# Patient Record
Sex: MALE | Race: BLACK OR AFRICAN AMERICAN | Employment: FULL TIME | URBAN - METROPOLITAN AREA
[De-identification: names, ages, dates, MRNs, and addresses within clinical notes are randomized per-mention and may not be internally consistent; named-entity substitution may affect disease eponyms.]

---

## 2017-06-20 ENCOUNTER — HOSPITAL ENCOUNTER (OUTPATIENT)
Dept: LAB | Age: 57
Discharge: HOME | End: 2017-06-20
Payer: COMMERCIAL

## 2017-06-20 DIAGNOSIS — E66.9 OBESITY (BMI 30-39.9): ICD-10-CM

## 2017-06-20 DIAGNOSIS — R73.03 PREDIABETES: ICD-10-CM

## 2017-06-20 LAB
ALBUMIN SERPL BCP-MCNC: 4.1 G/DL (ref 3.4–5)
ALBUMIN/GLOB SERPL: 1.2 {RATIO} (ref 0.8–1.7)
ALP SERPL-CCNC: 50 U/L (ref 45–117)
ALT SERPL-CCNC: 55 U/L (ref 16–61)
ANION GAP BLD CALC-SCNC: 7 MMOL/L (ref 3–18)
AST SERPL W P-5'-P-CCNC: 28 U/L (ref 15–37)
BILIRUB SERPL-MCNC: 0.7 MG/DL (ref 0.2–1)
BUN SERPL-MCNC: 12 MG/DL (ref 7–18)
BUN/CREAT SERPL: 13 (ref 12–20)
CALCIUM SERPL-MCNC: 9.3 MG/DL (ref 8.5–10.1)
CHLORIDE SERPL-SCNC: 106 MMOL/L (ref 100–108)
CHOLEST SERPL-MCNC: 180 MG/DL
CO2 SERPL-SCNC: 28 MMOL/L (ref 21–32)
CREAT SERPL-MCNC: 0.94 MG/DL (ref 0.6–1.3)
GLOBULIN SER CALC-MCNC: 3.3 G/DL (ref 2–4)
GLUCOSE SERPL-MCNC: 87 MG/DL (ref 74–99)
HBA1C MFR BLD: 5.7 % (ref 4.2–5.6)
HDLC SERPL-MCNC: 48 MG/DL (ref 40–60)
HDLC SERPL: 3.8 {RATIO} (ref 0–5)
LDLC SERPL CALC-MCNC: 103.4 MG/DL (ref 0–100)
LIPID PROFILE,FLP: ABNORMAL
POTASSIUM SERPL-SCNC: 4.4 MMOL/L (ref 3.5–5.5)
PROT SERPL-MCNC: 7.4 G/DL (ref 6.4–8.2)
SODIUM SERPL-SCNC: 141 MMOL/L (ref 136–145)
TRIGL SERPL-MCNC: 143 MG/DL (ref ?–150)
VLDLC SERPL CALC-MCNC: 28.6 MG/DL

## 2017-06-20 PROCEDURE — 83036 HEMOGLOBIN GLYCOSYLATED A1C: CPT | Performed by: INTERNAL MEDICINE

## 2017-06-20 PROCEDURE — 36415 COLL VENOUS BLD VENIPUNCTURE: CPT | Performed by: INTERNAL MEDICINE

## 2017-06-20 PROCEDURE — 80053 COMPREHEN METABOLIC PANEL: CPT | Performed by: INTERNAL MEDICINE

## 2017-06-20 PROCEDURE — 80061 LIPID PANEL: CPT | Performed by: INTERNAL MEDICINE
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
## 2017-06-27 ENCOUNTER — OFFICE VISIT (OUTPATIENT)
Dept: INTERNAL MEDICINE CLINIC | Age: 57
End: 2017-06-27

## 2017-06-27 VITALS
SYSTOLIC BLOOD PRESSURE: 136 MMHG | HEART RATE: 72 BPM | WEIGHT: 234.4 LBS | BODY MASS INDEX: 37.67 KG/M2 | OXYGEN SATURATION: 98 % | TEMPERATURE: 98.2 F | HEIGHT: 66 IN | DIASTOLIC BLOOD PRESSURE: 86 MMHG | RESPIRATION RATE: 18 BRPM

## 2017-06-27 DIAGNOSIS — R73.03 PREDIABETES: ICD-10-CM

## 2017-06-27 DIAGNOSIS — E66.9 OBESITY (BMI 30-39.9): Primary | ICD-10-CM

## 2017-06-27 DIAGNOSIS — M19.90 OSTEOARTHRITIS, UNSPECIFIED OSTEOARTHRITIS TYPE, UNSPECIFIED SITE: ICD-10-CM

## 2017-06-27 NOTE — PROGRESS NOTES
Chief Complaint   Patient presents with    Results     follow up with lab review     1. Have you been to the ER, urgent care clinic since your last visit? Hospitalized since your last visit? No    2. Have you seen or consulted any other health care providers outside of the [address] [place] [address] since your last visit? Include any pap smears or colon screening.  No

## 2017-06-27 NOTE — MR AVS SNAPSHOT
Visit Information Date & Time Provider Department Dept. Phone Encounter #  
 6/27/2017  3:15 PM Lucila Gallegos MD Internist of [address] [id number] Your Appointments 12/21/2017  7:55 AM  
LAB with [facility] NURSE VISIT Internist of [facility] ([facility]) Appt Note: lab  
 [address], [address] [facility] [address] [place]  
  
   
 [address], [address]  
  
    
 12/28/2017  2:45 PM  
Office Visit with Lucila Gallegos MD  
[facility] [address] [place] [address] [facility]: 6 month follow up  
 [address], [address] [place] [address] [address] [place]  
  
   
 [address], [address] Upcoming Health Maintenance Date Due INFLUENZA AGE 9 TO ADULT 8/1/2017 COLONOSCOPY 7/12/2018 DTaP/Tdap/Td series (2 - Td) 2/5/2024 Allergies as of 6/27/2017  Review Complete On: 12/19/2016 By: Lucila Gallegos MD  
 No Known Allergies Current Immunizations  Reviewed on 2/5/2014 Name Date  
 TD Vaccine 8/14/2008 Tdap 2/5/2014 Not reviewed this visit Vitals BP Pulse Temp Resp Height(growth percentile) Weight(growth percentile) 144/88 (BP 1 Location: Left arm, BP Patient Position: Sitting) 72 98.2 °F (36.8 °C) (Oral) 18 5' 6\" (1.676 m) 234 lb 6.4 oz (106.3 kg) SpO2 BMI Smoking Status 98% 37.83 kg/m2 Former Smoker Vitals History BMI and BSA Data Body Mass Index Body Surface Area  
 37.83 kg/m 2 2.22 m 2 Preferred Pharmacy Pharmacy Name Phone [address], [address] [place] [address] [phone] Your Updated Medication List  
  
   
This list is accurate as of: 6/27/17  4:24 PM.  Always use your most recent med list.  
  
  
  
  
 fluticasone 50 mcg/actuation nasal spray Commonly known as:  FLONASE  
2 sprays each nostril daily. naproxen 500 mg tablet Commonly known as:  NAPROSYN Take 500 mg by mouth two (2) times daily (with meals). ZyrTEC 10 mg Cap Generic drug:  Cetirizine Take 10 mg by mouth. Introducing [facility] SERVICES! [employer] introduces [employer] patient portal. Now you can access parts of your medical record, email your doctor's office, and request medication refills online. 1. In your internet browser, go to [email or web address] 2. Click on the First Time User? Click Here link in the Sign In box. You will see the New Member Sign Up page. 3. Enter your [employer] Access Code exactly as it appears below. You will not need to use this code after youve completed the sign-up process. If you do not sign up before the expiration date, you must request a new code. · [employer] Access Code: [email or web address] Expires: 9/25/2017  4:24 PM 
 
4. Enter the last four digits of your Social Security Number (xxxx) and Date of Birth (mm/dd/yyyy) as indicated and click Submit. You will be taken to the next sign-up page. 5. Create a [employer] ID. This will be your [employer] login ID and cannot be changed, so think of one that is secure and easy to remember. 6. Create a [employer] password. You can change your password at any time. 7. Enter your Password Reset Question and Answer. This can be used at a later time if you forget your password. 8. Enter your e-mail address. You will receive e-mail notification when new information is available in [address] 9. Click Sign Up. You can now view and download portions of your medical record. 10. Click the Download Summary menu link to download a portable copy of your medical information. If you have questions, please visit the Frequently Asked Questions section of the [employer] website. Remember, [employer] is NOT to be used for urgent needs. For medical emergencies, dial 911. Now available from your iPhone and Android! Please provide this summary of care documentation to your next provider. Your primary care clinician is listed as Octavio Lovelace. If you have any questions after today's visit, please call [phone].

## 2017-12-21 ENCOUNTER — HOSPITAL ENCOUNTER (OUTPATIENT)
Dept: LAB | Age: 57
Discharge: HOME | End: 2017-12-21
Payer: COMMERCIAL

## 2017-12-21 DIAGNOSIS — R73.03 PREDIABETES: ICD-10-CM

## 2017-12-21 LAB
ERYTHROCYTE [DISTWIDTH] IN BLOOD BY AUTOMATED COUNT: 12.7 % (ref 11.6–14.5)
HBA1C MFR BLD: 5.8 % (ref 4.2–5.6)
HCT VFR BLD AUTO: 44.8 % (ref 36–48)
HGB BLD-MCNC: 13.6 G/DL (ref 13–16)
MCH RBC QN AUTO: 27.6 PG (ref 24–34)
MCHC RBC AUTO-ENTMCNC: 30.4 G/DL (ref 31–37)
MCV RBC AUTO: 91.1 FL (ref 74–97)
PLATELET # BLD AUTO: 249 K/UL (ref 135–420)
PMV BLD AUTO: 11.6 FL (ref 9.2–11.8)
PSA SERPL-MCNC: 0.6 NG/ML (ref 0–4)
RBC # BLD AUTO: 4.92 M/UL (ref 4.7–5.5)
WBC # BLD AUTO: 4.7 K/UL (ref 4.6–13.2)

## 2017-12-21 PROCEDURE — 84153 ASSAY OF PSA TOTAL: CPT | Performed by: INTERNAL MEDICINE

## 2017-12-21 PROCEDURE — 83036 HEMOGLOBIN GLYCOSYLATED A1C: CPT | Performed by: INTERNAL MEDICINE

## 2017-12-21 PROCEDURE — 85027 COMPLETE CBC AUTOMATED: CPT | Performed by: INTERNAL MEDICINE

## 2017-12-21 PROCEDURE — 36415 COLL VENOUS BLD VENIPUNCTURE: CPT | Performed by: INTERNAL MEDICINE
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
## 2017-12-28 ENCOUNTER — OFFICE VISIT (OUTPATIENT)
Dept: INTERNAL MEDICINE CLINIC | Age: 57
End: 2017-12-28

## 2017-12-28 VITALS
BODY MASS INDEX: 38.41 KG/M2 | TEMPERATURE: 98.2 F | DIASTOLIC BLOOD PRESSURE: 84 MMHG | HEIGHT: 66 IN | RESPIRATION RATE: 14 BRPM | OXYGEN SATURATION: 99 % | WEIGHT: 239 LBS | HEART RATE: 67 BPM | SYSTOLIC BLOOD PRESSURE: 130 MMHG

## 2017-12-28 DIAGNOSIS — J30.89 CHRONIC NON-SEASONAL ALLERGIC RHINITIS, UNSPECIFIED TRIGGER: ICD-10-CM

## 2017-12-28 DIAGNOSIS — M19.90 OSTEOARTHRITIS, UNSPECIFIED OSTEOARTHRITIS TYPE, UNSPECIFIED SITE: ICD-10-CM

## 2017-12-28 DIAGNOSIS — R73.03 PREDIABETES: Primary | ICD-10-CM

## 2017-12-28 DIAGNOSIS — E66.01 MORBID OBESITY DUE TO EXCESS CALORIES (HCC): ICD-10-CM

## 2017-12-28 NOTE — MR AVS SNAPSHOT
Visit Information Date & Time Provider Department Dept. Phone Encounter #  
 12/28/2017  2:45 PM Lencho Chisholm MD Internists of [address] [place] [address] [phone] [id number] Your Appointments 6/21/2018  9:55 AM  
LAB with [facility] NURSE VISIT Internists of [address] [place] [address] ([facility]) Appt Note: labs [address], [address] [place] [address] [place]  
  
   
 [address], [address] [place] [address]  
  
    
 6/28/2018  2:45 PM  
Office Visit with Lencho Chisholm MD  
Internists of [address] [place] [address] [facility]) Appt Note: ov [address]. rd  
 [address], [address] [place] [address] [address] [place]  
  
   
 [address], [facility] Upcoming Health Maintenance Date Due Influenza Age 5 to Adult 8/1/2017 COLONOSCOPY 7/12/2018 DTaP/Tdap/Td series (2 - Td) 2/5/2024 Allergies as of 12/28/2017  Review Complete On: 12/28/2017 By: Charles Jeffrey No Known Allergies Current Immunizations  Reviewed on 2/5/2014 Name Date  
 TD Vaccine 8/14/2008 Tdap 2/5/2014 Not reviewed this visit Vitals BP Pulse Temp Resp Height(growth percentile) Weight(growth percentile) 130/84 (BP 1 Location: Left arm, BP Patient Position: Sitting) 67 98.2 °F (36.8 °C) (Oral) 14 5' 6\" (1.676 m) 239 lb (108.4 kg) SpO2 BMI Smoking Status 99% 38.58 kg/m2 Former Smoker Vitals History BMI and BSA Data Body Mass Index Body Surface Area 38.58 kg/m 2 2.25 m 2 Preferred Pharmacy Pharmacy Name Phone [address], [address] [place] [address] [phone] Your Updated Medication List  
  
   
This list is accurate as of: 12/28/17  3:48 PM.  Always use your most recent med list.  
  
  
  
  
 fluticasone 50 mcg/actuation nasal spray Commonly known as:  FLONASE  
2 sprays each nostril daily. naproxen 500 mg tablet Commonly known as:  NAPROSYN Take 500 mg by mouth two (2) times daily (with meals). ZyrTEC 10 mg Cap Generic drug:  Cetirizine Take 10 mg by mouth. Introducing [facility] SERVICES! Lay[employer] introduces [employer] patient portal. Now you can access parts of your medical record, email your doctor's office, and request medication refills online. 1. In your internet browser, go to [email or web address] 2. Click on the First Time User? Click Here link in the Sign In box. You will see the New Member Sign Up page. 3. Enter your [employer] Access Code exactly as it appears below. You will not need to use this code after youve completed the sign-up process. If you do not sign up before the expiration date, you must request a new code. · [employer] Access Code: [email or web address] Expires: 3/28/2018  2:29 PM 
 
4. Enter the last four digits of your Social Security Number (xxxx) and Date of Birth (mm/dd/yyyy) as indicated and click Submit. You will be taken to the next sign-up page. 5. Create a [employer] ID. This will be your [employer] login ID and cannot be changed, so think of one that is secure and easy to remember. 6. Create a [employer] password. You can change your password at any time. 7. Enter your Password Reset Question and Answer. This can be used at a later time if you forget your password. 8. Enter your e-mail address. You will receive e-mail notification when new information is available in [address] 9. Click Sign Up. You can now view and download portions of your medical record. 10. Click the Download Summary menu link to download a portable copy of your medical information. If you have questions, please visit the Frequently Asked Questions section of the [employer] website. Remember, [employer] is NOT to be used for urgent needs. For medical emergencies, dial 911. Now available from your iPhone and Android! Please provide this summary of care documentation to your next provider. Your primary care clinician is listed as Drinda Epley. If you have any questions after today's visit, please call [phone].

## 2017-12-28 NOTE — PROGRESS NOTES
1. Have you been to the ER, urgent care clinic or hospitalized since your last visit? NO.     2. Have you seen or consulted any other health care providers outside of the [address] [place] [address] since your last visit (Include any pap smears or colon screening)? NO      Do you have an Advanced Directive? NO    Would you like information on Advanced Directives?  NO

## 2018-02-12 ENCOUNTER — OFFICE VISIT (OUTPATIENT)
Dept: INTERNAL MEDICINE CLINIC | Age: 58
End: 2018-02-12

## 2018-02-12 ENCOUNTER — HOSPITAL ENCOUNTER (OUTPATIENT)
Dept: LAB | Age: 58
Discharge: HOME | End: 2018-02-12
Payer: COMMERCIAL

## 2018-02-12 VITALS
OXYGEN SATURATION: 98 % | TEMPERATURE: 98.7 F | WEIGHT: 232 LBS | DIASTOLIC BLOOD PRESSURE: 98 MMHG | HEART RATE: 65 BPM | BODY MASS INDEX: 37.28 KG/M2 | SYSTOLIC BLOOD PRESSURE: 144 MMHG | RESPIRATION RATE: 14 BRPM | HEIGHT: 66 IN

## 2018-02-12 DIAGNOSIS — M31.6 TEMPORAL ARTERITIS (HCC): Primary | ICD-10-CM

## 2018-02-12 DIAGNOSIS — M31.6 TEMPORAL ARTERITIS (HCC): ICD-10-CM

## 2018-02-12 LAB
CRP SERPL-MCNC: <0.3 MG/DL (ref 0–0.3)
ERYTHROCYTE [SEDIMENTATION RATE] IN BLOOD: 10 MM/HR (ref 0–20)

## 2018-02-12 PROCEDURE — 86140 C-REACTIVE PROTEIN: CPT | Performed by: INTERNAL MEDICINE

## 2018-02-12 PROCEDURE — 36415 COLL VENOUS BLD VENIPUNCTURE: CPT | Performed by: INTERNAL MEDICINE

## 2018-02-12 PROCEDURE — 85652 RBC SED RATE AUTOMATED: CPT | Performed by: INTERNAL MEDICINE

## 2018-02-12 RX ORDER — ACETAMINOPHEN 325 MG/1
TABLET ORAL
COMMUNITY
End: 2018-08-22

## 2018-02-12 NOTE — PROGRESS NOTES
1. Have you been to the ER, urgent care clinic or hospitalized since your last visit? NO.     2. Have you seen or consulted any other health care providers outside of the [address] [place] [address] since your last visit (Include any pap smears or colon screening)? NO      Do you have an Advanced Directive? NO    Would you like information on Advanced Directives?  NO
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
## 2018-02-13 ENCOUNTER — TELEPHONE (OUTPATIENT)
Dept: INTERNAL MEDICINE CLINIC | Age: 58
End: 2018-02-13

## 2018-02-13 DIAGNOSIS — R51.9 SEVERE HEADACHE: Primary | ICD-10-CM

## 2018-02-13 RX ORDER — BUTALBITAL, ACETAMINOPHEN AND CAFFEINE 50; 325; 40 MG/1; MG/1; MG/1
1 TABLET ORAL
Qty: 20 TAB | Refills: 0 | OUTPATIENT
Start: 2018-02-13 | End: 2018-08-22

## 2018-02-14 NOTE — TELEPHONE ENCOUNTER
fioricet called to pharmacy  Scheduling dept aware of Ct order and to schedule asap  Pt aware of normal labs, meds and ct scan

## 2018-02-14 NOTE — TELEPHONE ENCOUNTER
pls call    Labs neg  Ct head ordered -asap pls  [facility]

## 2018-02-21 ENCOUNTER — TELEPHONE (OUTPATIENT)
Dept: INTERNAL MEDICINE CLINIC | Age: 58
End: 2018-02-21

## 2018-02-21 ENCOUNTER — HOSPITAL ENCOUNTER (OUTPATIENT)
Dept: CT IMAGING | Age: 58
Discharge: HOME | End: 2018-02-21
Attending: INTERNAL MEDICINE
Payer: COMMERCIAL

## 2018-02-21 DIAGNOSIS — R51.9 SEVERE HEADACHE: ICD-10-CM

## 2018-02-21 PROCEDURE — 70450 CT HEAD/BRAIN W/O DYE: CPT

## 2018-02-22 ENCOUNTER — TELEPHONE (OUTPATIENT)
Dept: INTERNAL MEDICINE CLINIC | Age: 58
End: 2018-02-22

## 2018-02-22 DIAGNOSIS — R51.9 SEVERE HEADACHE: Primary | ICD-10-CM

## 2018-02-22 RX ORDER — HYDROCODONE BITARTRATE AND ACETAMINOPHEN 5; 325 MG/1; MG/1
1 TABLET ORAL
Qty: 20 TAB | Refills: 0 | Status: SHIPPED | OUTPATIENT
Start: 2018-02-22 | End: 2018-08-22

## 2018-02-22 NOTE — TELEPHONE ENCOUNTER
Pt c/o pain without improvement from pain med you prescribed him last week for headaches. Sounded very drained over the phone. Wants something stronger or advice as to what to do.   Please call him at [phone]

## 2018-02-22 NOTE — TELEPHONE ENCOUNTER
Spouse called. Said pt is in terrible pain, migraines non-stop for 4 weeks. She made him stay home from work because he is in such pain and probably shouldn't be driving. I went to [facility], who got Dr Tricia Pitts, so pt to be referred to neurologist ASAP. But needs immediate relief with some stronger pain medicine until he can get in with neurology. Wanted us to call her but I told her she is not on his permission to release info form so we would have to talk with patient directly. Is there a stronger medication that could be called in for him?

## 2018-02-22 NOTE — TELEPHONE ENCOUNTER
appt scheduled with DUY Alanis on 3/5/18 at the [facility] location, this was the earliest they had. The patient can call to see if someone cancels and he can get the cancellation spot.  We can ask RD if he can give him a stronger medicine, if his pain becomes unbearable he should go to the ER in the meantime, Doloris Duane will call to give him the appt details

## 2018-03-05 ENCOUNTER — DOCUMENTATION ONLY (OUTPATIENT)
Dept: NEUROLOGY | Age: 58
End: 2018-03-05

## 2018-03-05 ENCOUNTER — OFFICE VISIT (OUTPATIENT)
Dept: NEUROLOGY | Age: 58
End: 2018-03-05

## 2018-03-05 VITALS
BODY MASS INDEX: 36.48 KG/M2 | DIASTOLIC BLOOD PRESSURE: 90 MMHG | HEART RATE: 56 BPM | TEMPERATURE: 97.3 F | OXYGEN SATURATION: 98 % | RESPIRATION RATE: 16 BRPM | WEIGHT: 227 LBS | HEIGHT: 66 IN | SYSTOLIC BLOOD PRESSURE: 150 MMHG

## 2018-03-05 DIAGNOSIS — Z87.828 HISTORY OF GUNSHOT WOUND: ICD-10-CM

## 2018-03-05 DIAGNOSIS — R51.9 WORSENING HEADACHES: Primary | ICD-10-CM

## 2018-03-05 DIAGNOSIS — Z98.890 H/O CRANIOTOMY: ICD-10-CM

## 2018-03-05 DIAGNOSIS — R06.83 SNORING: ICD-10-CM

## 2018-03-05 DIAGNOSIS — R06.81 WITNESSED EPISODE OF APNEA: ICD-10-CM

## 2018-03-05 DIAGNOSIS — H53.9 VISUAL DISTURBANCE: ICD-10-CM

## 2018-03-05 NOTE — PROGRESS NOTES
Patient has appt scheduled with Dr. Velma Mckeon on Friday March 9, 2018 at 9am.  Patient is to arrive by 840am with picture ID and insurance card. [employer] is located at [address]. Their phone number is [phone]. Informed patient that appt has been made but patient was unable to take appt information since he was driving but states that he will call the office back to obtain all appt information.   Will continue to monitor status of referral.

## 2018-03-05 NOTE — PATIENT INSTRUCTIONS
Please have tests complete and follow up afterwards. I have placed referral for ophthalmology for dilated eye exam.    I have placed order for sleep study. You will follow up with our sleep doctor if testing is abnormal.     We will defer medications today at your discretion. If headache worsens call office with any concerns.

## 2018-03-05 NOTE — PROGRESS NOTES
Patient called back and was informed of all appt information for [facility]. Patient verbalized understanding of all information given.
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
## 2018-03-05 NOTE — PROGRESS NOTES
Chief Complaint   Patient presents with   [facility]     NP: Headaches. Patient states that he has been having constant headaches since the first week of February 2018. Patient placed in room 2. Chart and medications reviewed with patient and wife.

## 2018-03-16 ENCOUNTER — HOSPITAL ENCOUNTER (OUTPATIENT)
Dept: NEUROLOGY | Age: 58
Discharge: HOME | End: 2018-03-16
Attending: NURSE PRACTITIONER
Payer: COMMERCIAL

## 2018-03-16 ENCOUNTER — HOSPITAL ENCOUNTER (OUTPATIENT)
Dept: VASCULAR SURGERY | Age: 58
Discharge: HOME | End: 2018-03-16
Attending: NURSE PRACTITIONER
Payer: COMMERCIAL

## 2018-03-16 DIAGNOSIS — Z98.890 H/O CRANIOTOMY: ICD-10-CM

## 2018-03-16 DIAGNOSIS — R51.9 WORSENING HEADACHES: ICD-10-CM

## 2018-03-16 DIAGNOSIS — H53.9 VISUAL DISTURBANCE: ICD-10-CM

## 2018-03-16 DIAGNOSIS — Z87.828 HISTORY OF GUNSHOT WOUND: ICD-10-CM

## 2018-03-16 DIAGNOSIS — R06.83 SNORING: ICD-10-CM

## 2018-03-16 DIAGNOSIS — R06.81 WITNESSED EPISODE OF APNEA: ICD-10-CM

## 2018-03-16 PROCEDURE — 93880 EXTRACRANIAL BILAT STUDY: CPT

## 2018-03-16 PROCEDURE — 95819 EEG AWAKE AND ASLEEP: CPT

## 2018-03-16 PROCEDURE — 95816 EEG AWAKE AND DROWSY: CPT

## 2018-03-16 NOTE — PROCEDURES
[facility]  *** FINAL REPORT ***    Name: Dee Sullivan  MRN: [id number]    Outpatient  : 1960  HIS Order #: [id number]  [address] [facility] Visit #: [id number]  Date: 16 Mar 2018    TYPE OF TEST: Cerebrovascular Duplex    REASON FOR TEST  Visual Disturbance, Headache    Right Carotid:-             Proximal               Mid                 Distal  cm/s  Systolic  Diastolic  Systolic  Diastolic  Systolic  Diastolic  CCA:    236.6      24.0      102.0      29.0       89.0      27.0  Bulb:  ECA:    110.0      20.0  ICA:     65.0      25.0       58.0      25.0       56.0      28.0  ICA/CCA:  0.6       1.0    ICA Stenosis: Normal    Right Vertebral:-  Finding: Antegrade  Sys:       39.0  Tonya:       12.0    Right Subclavian: Normal    Left Carotid:-            Proximal                Mid                 Distal  cm/s  Systolic  Diastolic  Systolic  Diastolic  Systolic  Diastolic  CCA:    428.2      33.0       84.0      20.0       93.0      31.0  Bulb:  ECA:     81.0      20.0  ICA:     67.0      19.0       65.0      28.0       62.0      25.0  ICA/CCA:  0.5       0.6    ICA Stenosis: Normal    Left Vertebral:-  Finding: Antegrade  Sys:       52.0  Tonya:       23.0    Left Subclavian: Normal    INTERPRETATION/FINDINGS  Duplex images were obtained using 2-D gray scale, color flow, and  spectral Doppler analysis. 1. Normal carotid duplex exam without evidence of significant plaque  or stenosis in the internal carotid arteries bilaterally. 2. The common and external carotid arteries are patent, with no  evidence of hemodynamically significant stenosis. 3. Antegrade flow in both vertebral arteries. 4. Normal flow in both subclavian arteries. ADDITIONAL COMMENTS    I have personally reviewed the data relevant to the interpretation of  this  study. TECHNOLOGIST: Darrion Villanueva. Kory Carrillo  Signed: 2018 10:07 AM    PHYSICIAN: Antonio Wagoner.  Tom Calix MD  Signed: 2018 12:30 AM
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
## 2018-04-27 ENCOUNTER — HOSPITAL ENCOUNTER (OUTPATIENT)
Dept: SLEEP MEDICINE | Age: 58
Discharge: HOME | End: 2018-04-27
Payer: COMMERCIAL

## 2018-04-27 DIAGNOSIS — R06.81 WITNESSED APNEIC SPELLS: ICD-10-CM

## 2018-04-27 DIAGNOSIS — G47.33 OSA (OBSTRUCTIVE SLEEP APNEA): ICD-10-CM

## 2018-04-27 DIAGNOSIS — R06.83 SNORING: ICD-10-CM

## 2018-04-27 PROCEDURE — 95811 POLYSOM 6/>YRS CPAP 4/> PARM: CPT

## 2018-04-28 VITALS — SYSTOLIC BLOOD PRESSURE: 132 MMHG | HEART RATE: 56 BPM | DIASTOLIC BLOOD PRESSURE: 89 MMHG

## 2018-05-29 ENCOUNTER — DOCUMENTATION ONLY (OUTPATIENT)
Dept: PULMONOLOGY | Age: 58
End: 2018-05-29

## 2018-05-29 NOTE — PROGRESS NOTES
Called pt per Dr Yo Laureano re: Split/PSG/CPAP 17 cwp    Spoke w/ pt, discussed split PSG/CPAP 17 cwp- states understanding. Instructed to make appt to Neurology office to discuss further detail regarding Split/PSG result, mgt/tx. Instructed on good sleep hygiene. All questions answered. Encouraged to call back for questions and concerns.

## 2018-06-21 ENCOUNTER — HOSPITAL ENCOUNTER (OUTPATIENT)
Dept: LAB | Age: 58
Discharge: HOME | End: 2018-06-21
Payer: COMMERCIAL

## 2018-06-21 LAB
CHOLEST SERPL-MCNC: 165 MG/DL
ERYTHROCYTE [DISTWIDTH] IN BLOOD BY AUTOMATED COUNT: 12.6 % (ref 11.6–14.5)
HBA1C MFR BLD: 5.9 % (ref 4.2–5.6)
HCT VFR BLD AUTO: 41.4 % (ref 36–48)
HDLC SERPL-MCNC: 45 MG/DL (ref 40–60)
HDLC SERPL: 3.7 {RATIO} (ref 0–5)
HGB BLD-MCNC: 12.8 G/DL (ref 13–16)
LDLC SERPL CALC-MCNC: 94 MG/DL (ref 0–100)
LIPID PROFILE,FLP: NORMAL
MCH RBC QN AUTO: 27.7 PG (ref 24–34)
MCHC RBC AUTO-ENTMCNC: 30.9 G/DL (ref 31–37)
MCV RBC AUTO: 89.6 FL (ref 74–97)
PLATELET # BLD AUTO: 235 K/UL (ref 135–420)
PMV BLD AUTO: 11.2 FL (ref 9.2–11.8)
RBC # BLD AUTO: 4.62 M/UL (ref 4.7–5.5)
TRIGL SERPL-MCNC: 130 MG/DL (ref ?–150)
VLDLC SERPL CALC-MCNC: 26 MG/DL
WBC # BLD AUTO: 5.2 K/UL (ref 4.6–13.2)

## 2018-06-21 PROCEDURE — 85027 COMPLETE CBC AUTOMATED: CPT | Performed by: INTERNAL MEDICINE

## 2018-06-21 PROCEDURE — 36415 COLL VENOUS BLD VENIPUNCTURE: CPT | Performed by: INTERNAL MEDICINE

## 2018-06-21 PROCEDURE — 83036 HEMOGLOBIN GLYCOSYLATED A1C: CPT | Performed by: INTERNAL MEDICINE

## 2018-06-21 PROCEDURE — 80061 LIPID PANEL: CPT | Performed by: INTERNAL MEDICINE
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
## 2018-06-28 ENCOUNTER — OFFICE VISIT (OUTPATIENT)
Dept: INTERNAL MEDICINE CLINIC | Age: 58
End: 2018-06-28

## 2018-06-28 VITALS
SYSTOLIC BLOOD PRESSURE: 124 MMHG | TEMPERATURE: 98.5 F | HEART RATE: 72 BPM | DIASTOLIC BLOOD PRESSURE: 84 MMHG | RESPIRATION RATE: 14 BRPM | BODY MASS INDEX: 37.45 KG/M2 | OXYGEN SATURATION: 98 % | HEIGHT: 66 IN | WEIGHT: 233 LBS

## 2018-06-28 DIAGNOSIS — D64.9 ANEMIA, UNSPECIFIED TYPE: ICD-10-CM

## 2018-06-28 DIAGNOSIS — E66.01 MORBID OBESITY DUE TO EXCESS CALORIES (HCC): ICD-10-CM

## 2018-06-28 DIAGNOSIS — R73.03 PREDIABETES: Primary | ICD-10-CM

## 2018-06-28 NOTE — MR AVS SNAPSHOT
[facility] 
 
 
 [address], [address] [place] [address] [facility] 
[phone] Patient: John Leyva MRN: [id number] :1960 Visit Information Date & Time Provider Department Dept. Phone Encounter #  
 2018  2:45 PM Jacqueline Guajardo MD [phone] Your Appointments 2018  9:35 AM  
LAB with IOC NURSE VISIT [facility] ([facility]) Appt Note: labs [address], [address] [place] [address] [place]  
  
   
 [address], [address]  
  
    
 2018  2:45 PM  
Office Visit with Jacqueline Guajardo MD  
[facility]) Appt Note: ov 6mos. rd  
 [address], [address] [place] [address] [place]  
  
   
 [address], [address] Upcoming Health Maintenance Date Due COLONOSCOPY 2018 Influenza Age 5 to Adult 2018 DTaP/Tdap/Td series (2 - Td) 2024 Allergies as of 2018  Review Complete On: 2018 By: Alexi Persaud LPN No Known Allergies Current Immunizations  Reviewed on 2014 Name Date  
 TD Vaccine 2008 Tdap 2014 Not reviewed this visit Vitals BP Pulse Temp Resp Height(growth percentile) Weight(growth percentile) 124/84 72 98.5 °F (36.9 °C) (Oral) 14 5' 6\" (1.676 m) 233 lb (105.7 kg) SpO2 BMI Smoking Status 98% 37.61 kg/m2 Former Smoker Vitals History BMI and BSA Data Body Mass Index Body Surface Area  
 37.61 kg/m 2 2.22 m 2 Preferred Pharmacy Pharmacy Name Phone [address], [address] [place] [address] [phone] Your Updated Medication List  
  
   
This list is accurate as of 18  4:00 PM.  Always use your most recent med list.  
  
  
  
  
 acetaminophen 325 mg tablet Commonly known as:  TYLENOL Take  by mouth every four (4) hours as needed for Pain. butalbital-acetaminophen-caffeine -40 mg per tablet Commonly known as:  Gwynda Duet Take 1 Tab by mouth every six (6) hours as needed for Pain. fluticasone 50 mcg/actuation nasal spray Commonly known as:  FLONASE  
2 sprays each nostril daily. HYDROcodone-acetaminophen 5-325 mg per tablet Commonly known as:  Branden Salome Take 1 Tab by mouth every eight (8) hours as needed for Pain. Max Daily Amount: 3 Tabs. naproxen 500 mg tablet Commonly known as:  NAPROSYN Take 500 mg by mouth two (2) times daily (with meals). ZyrTEC 10 mg Cap Generic drug:  Cetirizine Take 10 mg by mouth. Introducing [facility]! [facility] introduces [employer] patient portal. Now you can access parts of your medical record, email your doctor's office, and request medication refills online. 1. In your internet browser, go to [email or web address] 2. Click on the First Time User? Click Here link in the Sign In box. You will see the New Member Sign Up page. 3. Enter your [employer] Access Code exactly as it appears below. You will not need to use this code after youve completed the sign-up process. If you do not sign up before the expiration date, you must request a new code. · [employer] Access Code: [email or web address] Expires: 7/7/2018  5:26 AM 
 
4. Enter the last four digits of your Social Security Number (xxxx) and Date of Birth (mm/dd/yyyy) as indicated and click Submit. You will be taken to the next sign-up page. 5. Create a [employer]more[employer] ID. This will be your [employer] login ID and cannot be changed, so think of one that is secure and easy to remember. 6. Create a [employer] password. You can change your password at any time. 7. Enter your Password Reset Question and Answer. This can be used at a later time if you forget your password. 8. Enter your e-mail address. You will receive e-mail notification when new information is available in [address] 9. Click Sign Up. You can now view and download portions of your medical record. 10. Click the Download Summary menu link to download a portable copy of your medical information. If you have questions, please visit the Frequently Asked Questions section of the [employer] website. Remember, [employer] is NOT to be used for urgent needs. For medical emergencies, dial 911. Now available from your iPhone and Android! Please provide this summary of care documentation to your next provider. Your primary care clinician is listed as Tristen Merchant. If you have any questions after today's visit, please call [phone].

## 2018-06-28 NOTE — PROGRESS NOTES
1. Have you been to the ER, urgent care clinic or hospitalized since your last visit? NO.     2. Have you seen or consulted any other health care providers outside of the [address] [place] [address] since your last visit (Include any pap smears or colon screening)? NO      Do you have an Advanced Directive? NO    Would you like information on Advanced Directives?  NO  Chief Complaint   Patient presents with    Morbid Obesity     6 month follow up with labs

## 2018-08-22 ENCOUNTER — OFFICE VISIT (OUTPATIENT)
Dept: NEUROLOGY | Age: 58
End: 2018-08-22

## 2018-08-22 VITALS
DIASTOLIC BLOOD PRESSURE: 80 MMHG | HEART RATE: 60 BPM | TEMPERATURE: 96.7 F | BODY MASS INDEX: 36.96 KG/M2 | HEIGHT: 66 IN | SYSTOLIC BLOOD PRESSURE: 120 MMHG | OXYGEN SATURATION: 98 % | RESPIRATION RATE: 18 BRPM | WEIGHT: 230 LBS

## 2018-08-22 DIAGNOSIS — R51.9 WORSENING HEADACHES: Primary | ICD-10-CM

## 2018-08-22 DIAGNOSIS — H53.9 VISUAL DISTURBANCE: ICD-10-CM

## 2018-08-22 NOTE — MR AVS SNAPSHOT
[address] [facility] 
 
 
 [address] 200 [facility] 
[phone] Patient: Jeanie Taylor MRN: [id number] :1960 Visit Information Date & Time Provider Department Dept. Phone Encounter #  
 2018  8:00 AM Wallace Boggs NP [facility] at 7 [facility] [id number] Follow-up Instructions Return if symptoms worsen or fail to improve, for He requires sleep follow up with Dr. Lelo Dexter- had sleep study complete. Your Appointments 2018  9:35 AM  
LAB with [facility] NURSE VISIT [facility] ([facility]) Appt Note: labs [address], Suite [facility] [address]  
  
   
 [address], [address]  
  
    
 2018  2:45 PM  
Office Visit with Puja Lozano MD  
[facility]) Appt Note: ov 6mos. rd  
 [address], Suite [facility] [address]  
  
   
 [address], [address] Upcoming Health Maintenance Date Due COLONOSCOPY 2018 Influenza Age 5 to Adult 2018 DTaP/Tdap/Td series (2 - Td) 2024 Allergies as of 2018  Review Complete On: 2018 By: Wallace Boggs NP No Known Allergies Current Immunizations  Reviewed on 2014 Name Date  
 TD Vaccine 2008 Tdap 2014 Not reviewed this visit Vitals BP Pulse Temp Resp Height(growth percentile) Weight(growth percentile) 120/80 (BP 1 Location: Right arm, BP Patient Position: Sitting) 60 96.7 °F (35.9 °C) (Oral) 18 5' 6\" (1.676 m) 230 lb (104.3 kg) SpO2 BMI Smoking Status 98% 37.12 kg/m2 Former Smoker Vitals History BMI and BSA Data Body Mass Index Body Surface Area  
 37.12 kg/m 2 2.2 m 2 Preferred Pharmacy Pharmacy Name Phone [address] [phone] Your Updated Medication List  
  
   
This list is accurate as of 8/22/18  8:20 AM.  Always use your most recent med list.  
  
  
  
  
 ZyrTEC 10 mg Cap Generic drug:  Cetirizine Take 10 mg by mouth. Follow-up Instructions Return if symptoms worsen or fail to improve, for He requires sleep follow up with Dr. Linda Quezada- had sleep study complete. Patient Instructions Please schedule a sleep study follow-up with Dr. Linda Quezada. Return to see me if headaches worsen or call the office with any concerns. Thank you for choosing [facility] for your  
 
care. You may receive a survey about your visit. We appreciate you taking time  
 
to complete this survey as we use your feedback to improve our services. We  
 
realize we are not perfect, but we strive to provide excellent care. Introducing [facility] & [facility] SERVICES! [facility] introduces [employer] patient portal. Now you can access parts of your medical record, email your doctor's office, and request medication refills online. 1. In your internet browser, go to [email or web address] 2. Click on the First Time User? Click Here link in the Sign In box. You will see the New Member Sign Up page. 3. Enter your Covac[employer] Access Code exactly as it appears below. You will not need to use this code after youve completed the sign-up process. If you do not sign up before the expiration date, you must request a new code. · [employer] Access Code: [email or web address] Expires: 11/20/2018  7:43 AM 
 
4. Enter the last four digits of your Social Security Number (xxxx) and Date of Birth (mm/dd/yyyy) as indicated and click Submit. You will be taken to the next sign-up page. 5. Create a Covacsis ID. This will be your Cov[employer]sis login ID and cannot be changed, so think of one that is secure and easy to remember. 6. Create a [email or web address] password. You can change your password at any time. 7. Enter your Password Reset Question and Answer. This can be used at a later time if you forget your password. 8. Enter your e-mail address. You will receive e-mail notification when new information is available in [address] 9. Click Sign Up. You can now view and download portions of your medical record. 10. Click the Download Summary menu link to download a portable copy of your medical information. If you have questions, please visit the Frequently Asked Questions section of the [email or web address] website. Remember, [email or web address] is NOT to be used for urgent needs. For medical emergencies, dial 911. Now available from your iPhone and Android! Please provide this summary of care documentation to your next provider. Your primary care clinician is listed as Urmila Garza. If you have any questions after today's visit, please call [phone].
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
## 2018-08-22 NOTE — PATIENT INSTRUCTIONS
Please schedule a sleep study follow-up with Dr. Patricia Franklin. Return to see me if headaches worsen or call the office with any concerns. Thank you for choosing [facility] for your     care. You may receive a survey about your visit. We appreciate you taking time     to complete this survey as we use your feedback to improve our services. We     realize we are not perfect, but we strive to provide excellent care.

## 2018-09-05 ENCOUNTER — OFFICE VISIT (OUTPATIENT)
Dept: NEUROLOGY | Age: 58
End: 2018-09-05

## 2018-09-05 VITALS
WEIGHT: 235 LBS | DIASTOLIC BLOOD PRESSURE: 88 MMHG | SYSTOLIC BLOOD PRESSURE: 146 MMHG | RESPIRATION RATE: 20 BRPM | HEIGHT: 66 IN | HEART RATE: 62 BPM | TEMPERATURE: 96.9 F | OXYGEN SATURATION: 98 % | BODY MASS INDEX: 37.77 KG/M2

## 2018-09-05 DIAGNOSIS — G47.33 OSA (OBSTRUCTIVE SLEEP APNEA): Primary | ICD-10-CM

## 2018-09-05 DIAGNOSIS — E66.01 CLASS 2 SEVERE OBESITY DUE TO EXCESS CALORIES WITH SERIOUS COMORBIDITY AND BODY MASS INDEX (BMI) OF 37.0 TO 37.9 IN ADULT (HCC): ICD-10-CM

## 2018-09-05 DIAGNOSIS — I10 ESSENTIAL HYPERTENSION: ICD-10-CM
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
## 2018-09-05 NOTE — MR AVS SNAPSHOT
[address] [facility] [address] 
 
 
 [address] [facility] 
[phone] Patient: Cookie Hyatt MRN: [id number] :1960 Visit Information Date & Time Provider Department Dept. Phone Encounter #  
 2018  9:15 AM Hong Long MD  [address] at [address] [facility] [id number] Follow-up Instructions Return in about 2 months (around 2018). Your Appointments 2018  8:45 AM  
Follow Up with Hong Long MD  
 [address] at [address] [facility] [address] [place] [address]) Appt Note: 2 month fu  
 [address] [facility] [address] [facility] 630 [facility] [address] [place] [address]  
  
    
 2018  9:35 AM  
LAB with [facility] NURSE VISIT [facility] of [address] [place] [address] ([address] [place] [address]) Appt Note: labs [address], Suite [facility] [address] [place]  
  
   
 [address], [address]  
  
    
 2018  2:45 PM  
Office Visit with Abril Cuevas MD  
[facility] of [address] [place] [address]) Appt Note: ov [address]mos. rd  
 [address], [address] [address] [address] [place]  
  
   
 [address], [address] Upcoming Health Maintenance Date Due COLONOSCOPY 2018 Influenza Age 5 to Adult 2018 DTaP/Tdap/Td series (2 - Td) 2024 Allergies as of 2018  Review Complete On: 2018 By: Yohannes Trujillo LPN No Known Allergies Current Immunizations  Reviewed on 2014 Name Date  
 TD Vaccine 2008 Tdap 2014 Not reviewed this visit You Were Diagnosed With   
  
 Codes Comments MAULIK (obstructive sleep apnea)    -  Primary ICD-10-CM: G47.33 
ICD-9-CM: 327.23 Essential hypertension     ICD-10-CM: I10 
ICD-9-CM: 401.9  Class 2 severe obesity due to excess calories with serious comorbidity and body mass index (BMI) of 37.0 to 37.9 in adult [facility])     ICD-10-CM: E66.01, C63.54 ICD-9-CM: 278.01, V85.37 Vitals BP Pulse Temp Resp Height(growth percentile) Weight(growth percentile) 146/88 (BP 1 Location: Left arm, BP Patient Position: Sitting) 62 96.9 °F (36.1 °C) (Oral) 20 5' 6\" (1.676 m) 235 lb (106.6 kg) SpO2 BMI Smoking Status 98% 37.93 kg/m2 Former Smoker Vitals History BMI and BSA Data Body Mass Index Body Surface Area  
 37.93 kg/m 2 2.23 m 2 Preferred Pharmacy Pharmacy Name Phone [address], [address] [place] [address] [phone] Your Updated Medication List  
  
   
This list is accurate as of 9/5/18 10:08 AM.  Always use your most recent med list.  
  
  
  
  
 ZyrTEC 10 mg Cap Generic drug:  Cetirizine Take 10 mg by mouth. We Performed the Following AMB SUPPLY ORDER [[id number] Custom] Comments: CPAP at 13 cms H2O with in-line humidification and supplies, DME to size for mask Follow-up Instructions Return in about 2 months (around 11/5/2018). Introducing hospitals & HEALTH SERVICES! [employer] introduces [employer] patient portal. Now you can access parts of your medical record, email your doctor's office, and request medication refills online. 1. In your internet browser, go to [email or web address] 2. Click on the First Time User? Click Here link in the Sign In box. You will see the New Member Sign Up page. 3. Enter your [employer] Access Code exactly as it appears below. You will not need to use this code after youve completed the sign-up process. If you do not sign up before the expiration date, you must request a new code. · [employer] Access Code: [email or web address] Expires: 11/20/2018  7:43 AM 
 
4. Enter the last four digits of your Social Security Number (xxxx) and Date of Birth (mm/dd/yyyy) as indicated and click Submit.  You will be taken to the next sign-up page. 5. Create a GreenFuel ID. This will be your [employer]Fuel login ID and cannot be changed, so think of one that is secure and easy to remember. 6. Create a GreenFuel password. You can change your password at any time. 7. Enter your Password Reset Question and Answer. This can be used at a later time if you forget your password. 8. Enter your e-mail address. You will receive e-mail notification when new information is available in [address] 9. Click Sign Up. You can now view and download portions of your medical record. 10. Click the Download Summary menu link to download a portable copy of your medical information. If you have questions, please visit the Frequently Asked Questions section of the GreenFuel website. Remember, [employer] is NOT to be used for urgent needs. For medical emergencies, dial 911. Now available from your iPhone and Android! Please provide this summary of care documentation to your next provider. Your primary care clinician is listed as Felicita Wong. If you have any questions after today's visit, please call [phone].

## 2018-09-05 NOTE — PROGRESS NOTES
Diams Lara is a 66 y.o. male in today for follow-up on HA. Learning assessment previously completed 6/5/2014; primary language is [place]. 1. Have you been to the ER, urgent care clinic since your last visit? Hospitalized since your last visit? No    2. Have you seen or consulted any other health care providers outside of the [address] [place] [address] since your last visit? Include any pap smears or colon screening.  No

## 2018-10-26 ENCOUNTER — DOCUMENTATION ONLY (OUTPATIENT)
Dept: NEUROLOGY | Age: 58
End: 2018-10-26

## 2018-10-26 NOTE — PROGRESS NOTES
Chart review for upcoming office visit to discuss CPAP compliance, download report requested from [address]

## 2018-11-05 ENCOUNTER — OFFICE VISIT (OUTPATIENT)
Dept: NEUROLOGY | Age: 58
End: 2018-11-05

## 2018-11-05 VITALS
HEART RATE: 70 BPM | RESPIRATION RATE: 20 BRPM | WEIGHT: 237.2 LBS | DIASTOLIC BLOOD PRESSURE: 84 MMHG | OXYGEN SATURATION: 96 % | HEIGHT: 66 IN | SYSTOLIC BLOOD PRESSURE: 142 MMHG | BODY MASS INDEX: 38.12 KG/M2 | TEMPERATURE: 97.8 F

## 2018-11-05 DIAGNOSIS — G47.33 OSA (OBSTRUCTIVE SLEEP APNEA): Primary | ICD-10-CM

## 2018-11-05 DIAGNOSIS — E66.01 CLASS 2 SEVERE OBESITY DUE TO EXCESS CALORIES WITH SERIOUS COMORBIDITY AND BODY MASS INDEX (BMI) OF 37.0 TO 37.9 IN ADULT (HCC): ICD-10-CM

## 2018-11-05 DIAGNOSIS — I10 ESSENTIAL HYPERTENSION: ICD-10-CM
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
## 2018-12-21 ENCOUNTER — HOSPITAL ENCOUNTER (OUTPATIENT)
Dept: LAB | Age: 58
Discharge: HOME | End: 2018-12-21
Payer: COMMERCIAL

## 2018-12-21 ENCOUNTER — APPOINTMENT (OUTPATIENT)
Dept: INTERNAL MEDICINE CLINIC | Age: 58
End: 2018-12-21

## 2018-12-21 DIAGNOSIS — D64.9 ANEMIA, UNSPECIFIED TYPE: ICD-10-CM

## 2018-12-21 LAB
ANION GAP SERPL CALC-SCNC: 6 MMOL/L (ref 3–18)
BUN SERPL-MCNC: 11 MG/DL (ref 7–18)
BUN/CREAT SERPL: 10 (ref 12–20)
CALCIUM SERPL-MCNC: 9.2 MG/DL (ref 8.5–10.1)
CHLORIDE SERPL-SCNC: 109 MMOL/L (ref 100–108)
CO2 SERPL-SCNC: 27 MMOL/L (ref 21–32)
CREAT SERPL-MCNC: 1.05 MG/DL (ref 0.6–1.3)
FERRITIN SERPL-MCNC: 436 NG/ML (ref 8–388)
FOLATE SERPL-MCNC: >20 NG/ML (ref 3.1–17.5)
GLUCOSE SERPL-MCNC: 92 MG/DL (ref 74–99)
HBA1C MFR BLD: 5.7 % (ref 4.2–5.6)
IRON SATN MFR SERPL: 28 %
IRON SERPL-MCNC: 103 UG/DL (ref 50–175)
POTASSIUM SERPL-SCNC: 4.4 MMOL/L (ref 3.5–5.5)
SODIUM SERPL-SCNC: 142 MMOL/L (ref 136–145)
TIBC SERPL-MCNC: 372 UG/DL (ref 250–450)
VIT B12 SERPL-MCNC: 728 PG/ML (ref 211–911)

## 2018-12-21 PROCEDURE — 36415 COLL VENOUS BLD VENIPUNCTURE: CPT

## 2018-12-21 PROCEDURE — 83540 ASSAY OF IRON: CPT

## 2018-12-21 PROCEDURE — 80048 BASIC METABOLIC PNL TOTAL CA: CPT

## 2018-12-21 PROCEDURE — 83036 HEMOGLOBIN GLYCOSYLATED A1C: CPT

## 2018-12-21 PROCEDURE — 82607 VITAMIN B-12: CPT

## 2018-12-21 PROCEDURE — 82728 ASSAY OF FERRITIN: CPT

## 2018-12-21 PROCEDURE — 84155 ASSAY OF PROTEIN SERUM: CPT

## 2018-12-24 LAB
ALBUMIN SERPL ELPH-MCNC: 4 G/DL (ref 2.9–4.4)
ALBUMIN/GLOB SERPL: 1.2 {RATIO} (ref 0.7–1.7)
ALPHA1 GLOB SERPL ELPH-MCNC: 0.2 G/DL (ref 0–0.4)
ALPHA2 GLOB SERPL ELPH-MCNC: 0.6 G/DL (ref 0.4–1)
B-GLOBULIN SERPL ELPH-MCNC: 1.3 G/DL (ref 0.7–1.3)
GAMMA GLOB SERPL ELPH-MCNC: 1.4 G/DL (ref 0.4–1.8)
GLOBULIN SER CALC-MCNC: 3.4 G/DL (ref 2.2–3.9)
M PROTEIN SERPL ELPH-MCNC: NORMAL G/DL
PROT SERPL-MCNC: 7.4 G/DL (ref 6–8.5)
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
## 2018-12-26 ENCOUNTER — OFFICE VISIT (OUTPATIENT)
Dept: INTERNAL MEDICINE CLINIC | Age: 58
End: 2018-12-26

## 2018-12-26 VITALS
BODY MASS INDEX: 38.47 KG/M2 | TEMPERATURE: 98.2 F | DIASTOLIC BLOOD PRESSURE: 84 MMHG | SYSTOLIC BLOOD PRESSURE: 136 MMHG | WEIGHT: 239.4 LBS | HEART RATE: 64 BPM | RESPIRATION RATE: 18 BRPM | OXYGEN SATURATION: 98 % | HEIGHT: 66 IN

## 2018-12-26 DIAGNOSIS — E66.01 MORBID OBESITY DUE TO EXCESS CALORIES (HCC): ICD-10-CM

## 2018-12-26 DIAGNOSIS — Z12.5 SCREENING FOR MALIGNANT NEOPLASM OF PROSTATE: ICD-10-CM

## 2018-12-26 DIAGNOSIS — D64.9 ANEMIA, UNSPECIFIED TYPE: ICD-10-CM

## 2018-12-26 DIAGNOSIS — Z80.0 FHX: COLON CANCER: ICD-10-CM

## 2018-12-26 DIAGNOSIS — R73.03 PREDIABETES: Primary | ICD-10-CM

## 2018-12-26 NOTE — PROGRESS NOTES
Coordination of Care Questionaire:   1. Have you been to the ER, urgent care clinic since your last visit? Hospitalized since your last visit? NO    2. Have you seen or consulted any other health care providers outside of the [address] [place] [address] since your last visit? Include any pap smears or colon screening. NO    Advanced Directive:   1. Do you have an Advanced Directive? NO    2. Would you like information on Advanced Directives?  YES

## 2019-01-02 ENCOUNTER — OFFICE VISIT (OUTPATIENT)
Dept: NEUROLOGY | Age: 59
End: 2019-01-02

## 2019-01-02 VITALS
HEART RATE: 77 BPM | RESPIRATION RATE: 18 BRPM | TEMPERATURE: 97.6 F | SYSTOLIC BLOOD PRESSURE: 130 MMHG | OXYGEN SATURATION: 97 % | WEIGHT: 241 LBS | BODY MASS INDEX: 38.73 KG/M2 | HEIGHT: 66 IN | DIASTOLIC BLOOD PRESSURE: 80 MMHG

## 2019-01-02 DIAGNOSIS — G47.33 OSA (OBSTRUCTIVE SLEEP APNEA): Primary | ICD-10-CM

## 2019-01-02 DIAGNOSIS — E66.01 MORBID OBESITY (HCC): ICD-10-CM
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
## 2019-01-02 NOTE — PATIENT INSTRUCTIONS
Thank you for choosing [address] [place] [address] and [address] [place] [address] [facility] for your  
 
care. You may receive a survey about your visit. We appreciate you taking time  
 
to complete this survey as we use your feedback to improve our services. We  
 
realize we are not perfect, but we strive to provide excellent care.

## 2019-01-02 NOTE — PROGRESS NOTES
Chief Complaint Patient presents with  Sleep Problem  
  follow up PHQ over the last two weeks 1/2/2019 Little interest or pleasure in doing things Not at all Feeling down, depressed, irritable, or hopeless Not at all Total Score PHQ 2 0

## 2019-04-18 ENCOUNTER — OFFICE VISIT (OUTPATIENT)
Dept: NEUROLOGY | Age: 59
End: 2019-04-18

## 2019-04-18 VITALS
HEIGHT: 66 IN | RESPIRATION RATE: 16 BRPM | WEIGHT: 226 LBS | DIASTOLIC BLOOD PRESSURE: 96 MMHG | HEART RATE: 73 BPM | OXYGEN SATURATION: 93 % | SYSTOLIC BLOOD PRESSURE: 160 MMHG | BODY MASS INDEX: 36.32 KG/M2 | TEMPERATURE: 98.2 F

## 2019-04-18 DIAGNOSIS — I10 ESSENTIAL HYPERTENSION: ICD-10-CM

## 2019-04-18 DIAGNOSIS — G47.33 OSA (OBSTRUCTIVE SLEEP APNEA): Primary | ICD-10-CM

## 2019-04-18 DIAGNOSIS — E66.01 MORBID OBESITY (HCC): ICD-10-CM

## 2019-04-18 NOTE — PROGRESS NOTES
ROOM # 3    Santy Pierre presents today for   Chief Complaint   Patient presents with    Follow-up    Sleep Apnea       Santy Pierre preferred language for health care discussion is english/other. Depression Screening:  3 most recent [facility] Screens 1/2/2019 6/28/2018 2/12/2018 12/28/2017 12/19/2016 12/19/2016 6/12/2015   Little interest or pleasure in doing things Not at all Not at all Not at all Not at all Not at all Not at all Not at all   Feeling down, depressed, irritable, or hopeless Not at all Not at all Not at all Not at all Not at all Not at all Not at all   Total Score PHQ 2 0 0 0 0 0 0 0       Learning Assessment:  Learning Assessment 6/5/2014   PRIMARY LEARNER Patient   HIGHEST LEVEL OF EDUCATION - PRIMARY LEARNER  DID NOT GRADUATE [facility] PRIMARY LEARNER NONE   CO-LEARNER CAREGIVER No   PRIMARY LANGUAGE ENGLISH   LEARNER PREFERENCE PRIMARY DEMONSTRATION     LISTENING     [address]   ANSWERED BY patient   RELATIONSHIP SELF       Abuse Screening:  Abuse Screening Questionnaire 6/28/2018 12/19/2016 6/12/2015 8/21/2014 6/5/2014   Do you ever feel afraid of your partner? N N N N N   Are you in a relationship with someone who physically or mentally threatens you? N N N N N   Is it safe for you to go home? Y Y Y Y Y       Fall Risk  No flowsheet data found. Visit Vitals  BP (!) 160/96 (BP 1 Location: Left arm, BP Patient Position: Sitting)   Pulse 73   Temp 98.2 °F (36.8 °C) (Oral)   Resp 16   Ht 5' 6\" (1.676 m)   Wt 226 lb (102.5 kg)   SpO2 93%   BMI 36.48 kg/m²       Health Maintenance reviewed and discussed per provider. Yes    Santy Pierre is due for   Health Maintenance Due   Topic Date Due    Shingrix Vaccine Age 50> (1 of 2) 04/06/2012     Please order/place referral if appropriate. Advance Directive:  1. Do you have an advance directive in place? Patient Reply: No    2. If not, would you like material regarding how to put one in place?  Patient Reply: No    Coordination of Care:  1. Have you been to the ER, urgent care clinic since your last visit? Hospitalized since your last visit?  No
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
## 2019-06-03 ENCOUNTER — APPOINTMENT (OUTPATIENT)
Dept: INTERNAL MEDICINE CLINIC | Age: 59
End: 2019-06-03

## 2019-06-03 ENCOUNTER — HOSPITAL ENCOUNTER (OUTPATIENT)
Dept: LAB | Age: 59
Discharge: HOME | End: 2019-06-03
Payer: COMMERCIAL

## 2019-06-03 DIAGNOSIS — Z12.5 SCREENING FOR MALIGNANT NEOPLASM OF PROSTATE: ICD-10-CM

## 2019-06-03 DIAGNOSIS — D64.9 ANEMIA, UNSPECIFIED TYPE: ICD-10-CM

## 2019-06-03 DIAGNOSIS — R73.03 PREDIABETES: ICD-10-CM

## 2019-06-03 LAB
CHOLEST SERPL-MCNC: 198 MG/DL
ERYTHROCYTE [DISTWIDTH] IN BLOOD BY AUTOMATED COUNT: 12.5 % (ref 11.6–14.5)
HCT VFR BLD AUTO: 43.9 % (ref 36–48)
HDLC SERPL-MCNC: 55 MG/DL (ref 40–60)
HDLC SERPL: 3.6 {RATIO} (ref 0–5)
HGB BLD-MCNC: 13 G/DL (ref 13–16)
LDLC SERPL CALC-MCNC: 121 MG/DL (ref 0–100)
LIPID PROFILE,FLP: ABNORMAL
MCH RBC QN AUTO: 27.3 PG (ref 24–34)
MCHC RBC AUTO-ENTMCNC: 29.6 G/DL (ref 31–37)
MCV RBC AUTO: 92.2 FL (ref 74–97)
PLATELET # BLD AUTO: 238 K/UL (ref 135–420)
PMV BLD AUTO: 11.4 FL (ref 9.2–11.8)
PSA SERPL-MCNC: 0.5 NG/ML (ref 0–4)
RBC # BLD AUTO: 4.76 M/UL (ref 4.7–5.5)
TRIGL SERPL-MCNC: 110 MG/DL (ref ?–150)
VLDLC SERPL CALC-MCNC: 22 MG/DL
WBC # BLD AUTO: 5.2 K/UL (ref 4.6–13.2)

## 2019-06-03 PROCEDURE — 85027 COMPLETE CBC AUTOMATED: CPT

## 2019-06-03 PROCEDURE — 36415 COLL VENOUS BLD VENIPUNCTURE: CPT

## 2019-06-03 PROCEDURE — 80061 LIPID PANEL: CPT

## 2019-06-03 PROCEDURE — 83036 HEMOGLOBIN GLYCOSYLATED A1C: CPT

## 2019-06-03 PROCEDURE — 84153 ASSAY OF PSA TOTAL: CPT

## 2019-06-04 LAB — HBA1C MFR BLD: 6.4 % (ref 4.2–5.6)
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
## 2019-06-14 ENCOUNTER — OFFICE VISIT (OUTPATIENT)
Dept: INTERNAL MEDICINE CLINIC | Age: 59
End: 2019-06-14

## 2019-06-14 VITALS
HEIGHT: 66 IN | TEMPERATURE: 98.4 F | BODY MASS INDEX: 38.09 KG/M2 | SYSTOLIC BLOOD PRESSURE: 152 MMHG | OXYGEN SATURATION: 98 % | WEIGHT: 237 LBS | RESPIRATION RATE: 14 BRPM | HEART RATE: 69 BPM | DIASTOLIC BLOOD PRESSURE: 96 MMHG

## 2019-06-14 DIAGNOSIS — R73.03 PREDIABETES: ICD-10-CM

## 2019-06-14 DIAGNOSIS — J30.89 CHRONIC NON-SEASONAL ALLERGIC RHINITIS: ICD-10-CM

## 2019-06-14 DIAGNOSIS — I10 PRIMARY HYPERTENSION: Primary | ICD-10-CM

## 2019-06-14 DIAGNOSIS — Z12.11 ENCOUNTER FOR SCREENING COLONOSCOPY: ICD-10-CM

## 2019-06-14 DIAGNOSIS — E78.5 HYPERLIPIDEMIA, UNSPECIFIED HYPERLIPIDEMIA TYPE: ICD-10-CM

## 2019-06-14 DIAGNOSIS — E66.01 MORBID OBESITY DUE TO EXCESS CALORIES (HCC): ICD-10-CM

## 2019-06-14 NOTE — PROGRESS NOTES
Tera Galindo presents today for   Chief Complaint   Patient presents with    Follow-up     with labs              Depression Screening:  3 most recent PHQ Screens 1/2/2019   Little interest or pleasure in doing things Not at all   Feeling down, depressed, irritable, or hopeless Not at all   Total Score PHQ 2 0       Learning Assessment:  Learning Assessment 6/5/2014   PRIMARY LEARNER Patient   HIGHEST LEVEL OF EDUCATION - PRIMARY LEARNER  DID NOT GRADUATE HIGH SCHOOL   BARRIERS PRIMARY LEARNER NONE   CO-LEARNER CAREGIVER No   PRIMARY LANGUAGE ENGLISH   LEARNER PREFERENCE PRIMARY DEMONSTRATION     LISTENING     PICTURES     READING     VIDEOS   ANSWERED BY patient   RELATIONSHIP SELF       Abuse Screening:  Abuse Screening Questionnaire 6/14/2019   Do you ever feel afraid of your partner? N   Are you in a relationship with someone who physically or mentally threatens you? N   Is it safe for you to go home? Y       Fall Risk  No flowsheet data found. Coordination of Care:  1. Have you been to the ER, urgent care clinic since your last visit? Hospitalized since your last visit? no    2. Have you seen or consulted any other health care providers outside of the [address] [place] [address] since your last visit? Include any pap smears or colon screening.  no

## 2019-10-14 ENCOUNTER — APPOINTMENT (OUTPATIENT)
Dept: INTERNAL MEDICINE CLINIC | Age: 59
End: 2019-10-14

## 2019-10-14 ENCOUNTER — HOSPITAL ENCOUNTER (OUTPATIENT)
Dept: LAB | Age: 59
Discharge: HOME | End: 2019-10-14
Payer: COMMERCIAL

## 2019-10-14 DIAGNOSIS — E78.5 HYPERLIPIDEMIA, UNSPECIFIED HYPERLIPIDEMIA TYPE: ICD-10-CM

## 2019-10-14 DIAGNOSIS — R73.03 PREDIABETES: ICD-10-CM

## 2019-10-14 LAB
ALBUMIN SERPL-MCNC: 4.4 G/DL (ref 3.4–5)
ALBUMIN/GLOB SERPL: 1.3 {RATIO} (ref 0.8–1.7)
ALP SERPL-CCNC: 48 U/L (ref 45–117)
ALT SERPL-CCNC: 59 U/L (ref 16–61)
ANION GAP SERPL CALC-SCNC: 5 MMOL/L (ref 3–18)
AST SERPL-CCNC: 32 U/L (ref 10–38)
BILIRUB SERPL-MCNC: 0.8 MG/DL (ref 0.2–1)
BUN SERPL-MCNC: 13 MG/DL (ref 7–18)
BUN/CREAT SERPL: 13 (ref 12–20)
CALCIUM SERPL-MCNC: 9.3 MG/DL (ref 8.5–10.1)
CHLORIDE SERPL-SCNC: 107 MMOL/L (ref 100–111)
CO2 SERPL-SCNC: 27 MMOL/L (ref 21–32)
CREAT SERPL-MCNC: 0.97 MG/DL (ref 0.6–1.3)
GLOBULIN SER CALC-MCNC: 3.3 G/DL (ref 2–4)
GLUCOSE SERPL-MCNC: 94 MG/DL (ref 74–99)
HBA1C MFR BLD: 5.6 % (ref 4.2–5.6)
POTASSIUM SERPL-SCNC: 4.4 MMOL/L (ref 3.5–5.5)
PROT SERPL-MCNC: 7.7 G/DL (ref 6.4–8.2)
SODIUM SERPL-SCNC: 139 MMOL/L (ref 136–145)

## 2019-10-14 PROCEDURE — 80053 COMPREHEN METABOLIC PANEL: CPT

## 2019-10-14 PROCEDURE — 36415 COLL VENOUS BLD VENIPUNCTURE: CPT

## 2019-10-14 PROCEDURE — 83036 HEMOGLOBIN GLYCOSYLATED A1C: CPT

## 2019-10-14 PROCEDURE — 80061 LIPID PANEL: CPT

## 2019-10-15 LAB
CHOLEST SERPL-MCNC: 191 MG/DL
HDLC SERPL-MCNC: 54 MG/DL (ref 40–60)
HDLC SERPL: 3.5 {RATIO} (ref 0–5)
LDLC SERPL CALC-MCNC: 118.8 MG/DL (ref 0–100)
LIPID PROFILE,FLP: ABNORMAL
TRIGL SERPL-MCNC: 91 MG/DL (ref ?–150)
VLDLC SERPL CALC-MCNC: 18.2 MG/DL

## 2019-10-18 ENCOUNTER — OFFICE VISIT (OUTPATIENT)
Dept: NEUROLOGY | Age: 59
End: 2019-10-18

## 2019-10-18 VITALS
BODY MASS INDEX: 36 KG/M2 | SYSTOLIC BLOOD PRESSURE: 126 MMHG | RESPIRATION RATE: 16 BRPM | DIASTOLIC BLOOD PRESSURE: 92 MMHG | OXYGEN SATURATION: 96 % | TEMPERATURE: 98.6 F | HEART RATE: 96 BPM | WEIGHT: 224 LBS | HEIGHT: 66 IN

## 2019-10-18 DIAGNOSIS — I10 ESSENTIAL HYPERTENSION: ICD-10-CM

## 2019-10-18 DIAGNOSIS — E66.01 MORBID OBESITY (HCC): ICD-10-CM

## 2019-10-18 DIAGNOSIS — G47.33 OSA (OBSTRUCTIVE SLEEP APNEA): Primary | ICD-10-CM

## 2019-10-18 DIAGNOSIS — F51.12 INSUFFICIENT SLEEP SYNDROME: ICD-10-CM

## 2019-10-22 ENCOUNTER — OFFICE VISIT (OUTPATIENT)
Dept: INTERNAL MEDICINE CLINIC | Age: 59
End: 2019-10-22

## 2019-10-22 VITALS
OXYGEN SATURATION: 98 % | WEIGHT: 219 LBS | DIASTOLIC BLOOD PRESSURE: 98 MMHG | BODY MASS INDEX: 35.2 KG/M2 | TEMPERATURE: 98.2 F | HEIGHT: 66 IN | RESPIRATION RATE: 14 BRPM | SYSTOLIC BLOOD PRESSURE: 138 MMHG | HEART RATE: 69 BPM

## 2019-10-22 DIAGNOSIS — I10 PRIMARY HYPERTENSION: Primary | ICD-10-CM

## 2019-10-22 DIAGNOSIS — E66.01 MORBID OBESITY DUE TO EXCESS CALORIES (HCC): ICD-10-CM

## 2019-10-22 DIAGNOSIS — E78.5 HYPERLIPIDEMIA, UNSPECIFIED HYPERLIPIDEMIA TYPE: ICD-10-CM

## 2019-10-22 DIAGNOSIS — R73.03 PREDIABETES: ICD-10-CM

## 2019-10-22 DIAGNOSIS — Z99.89 OSA ON CPAP: ICD-10-CM

## 2019-10-22 DIAGNOSIS — G47.33 OSA ON CPAP: ICD-10-CM

## 2019-10-22 PROBLEM — K57.90 DIVERTICULOSIS: Status: ACTIVE | Noted: 2019-10-22

## 2019-10-22 RX ORDER — TRIAMTERENE AND HYDROCHLOROTHIAZIDE 37.5; 25 MG/1; MG/1
1 CAPSULE ORAL DAILY
Qty: 30 CAP | Refills: 5 | Status: SHIPPED | OUTPATIENT
Start: 2019-10-22 | End: 2020-05-21 | Stop reason: SDUPTHER

## 2019-10-22 NOTE — PROGRESS NOTES
Isac Mcneil presents today for   Chief Complaint   Patient presents with    Cholesterol Problem     4 month follow up with labs              Depression Screening:  3 most recent PHQ Screens 1/2/2019   Little interest or pleasure in doing things Not at all   Feeling down, depressed, irritable, or hopeless Not at all   Total Score PHQ 2 0       Learning Assessment:  Learning Assessment 6/5/2014   PRIMARY LEARNER Patient   HIGHEST LEVEL OF EDUCATION - PRIMARY LEARNER  DID NOT GRADUATE HIGH SCHOOL   BARRIERS PRIMARY LEARNER NONE   CO-LEARNER CAREGIVER No   PRIMARY LANGUAGE ENGLISH   LEARNER PREFERENCE PRIMARY DEMONSTRATION     LISTENING     PICTURES     READING     VIDEOS   ANSWERED BY patient   RELATIONSHIP SELF       Abuse Screening:  Abuse Screening Questionnaire 6/14/2019   Do you ever feel afraid of your partner? N   Are you in a relationship with someone who physically or mentally threatens you? N   Is it safe for you to go home? Y       Fall Risk  No flowsheet data found. Coordination of Care:  1. Have you been to the ER, urgent care clinic since your last visit? Hospitalized since your last visit? no    2. Have you seen or consulted any other health care providers outside of the [address] [place] [address] since your last visit? Include any pap smears or colon screening.  no

## 2020-02-07 ENCOUNTER — HOSPITAL ENCOUNTER (OUTPATIENT)
Dept: LAB | Age: 60
Discharge: HOME | End: 2020-02-07
Payer: COMMERCIAL

## 2020-02-07 ENCOUNTER — APPOINTMENT (OUTPATIENT)
Dept: INTERNAL MEDICINE CLINIC | Age: 60
End: 2020-02-07

## 2020-02-07 DIAGNOSIS — R73.03 PREDIABETES: ICD-10-CM

## 2020-02-07 DIAGNOSIS — E78.5 HYPERLIPIDEMIA, UNSPECIFIED HYPERLIPIDEMIA TYPE: ICD-10-CM

## 2020-02-07 LAB
ANION GAP SERPL CALC-SCNC: 6 MMOL/L (ref 3–18)
BUN SERPL-MCNC: 16 MG/DL (ref 7–18)
BUN/CREAT SERPL: 16 (ref 12–20)
CALCIUM SERPL-MCNC: 9.1 MG/DL (ref 8.5–10.1)
CHLORIDE SERPL-SCNC: 107 MMOL/L (ref 100–111)
CHOLEST SERPL-MCNC: 203 MG/DL
CO2 SERPL-SCNC: 27 MMOL/L (ref 21–32)
CREAT SERPL-MCNC: 0.97 MG/DL (ref 0.6–1.3)
EST. AVERAGE GLUCOSE BLD GHB EST-MCNC: 114 MG/DL
GLUCOSE SERPL-MCNC: 101 MG/DL (ref 74–99)
HBA1C MFR BLD: 5.6 % (ref 4.2–5.6)
HDLC SERPL-MCNC: 46 MG/DL (ref 40–60)
HDLC SERPL: 4.4 {RATIO} (ref 0–5)
LDLC SERPL CALC-MCNC: 137.8 MG/DL (ref 0–100)
LIPID PROFILE,FLP: ABNORMAL
POTASSIUM SERPL-SCNC: 4 MMOL/L (ref 3.5–5.5)
SODIUM SERPL-SCNC: 140 MMOL/L (ref 136–145)
TRIGL SERPL-MCNC: 96 MG/DL (ref ?–150)
VLDLC SERPL CALC-MCNC: 19.2 MG/DL

## 2020-02-07 PROCEDURE — 80048 BASIC METABOLIC PNL TOTAL CA: CPT

## 2020-02-07 PROCEDURE — 83036 HEMOGLOBIN GLYCOSYLATED A1C: CPT

## 2020-02-07 PROCEDURE — 80061 LIPID PANEL: CPT

## 2020-02-07 PROCEDURE — 36415 COLL VENOUS BLD VENIPUNCTURE: CPT
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
## 2020-02-14 ENCOUNTER — OFFICE VISIT (OUTPATIENT)
Dept: INTERNAL MEDICINE CLINIC | Age: 60
End: 2020-02-14

## 2020-02-14 VITALS
SYSTOLIC BLOOD PRESSURE: 122 MMHG | OXYGEN SATURATION: 97 % | HEIGHT: 66 IN | BODY MASS INDEX: 35.68 KG/M2 | DIASTOLIC BLOOD PRESSURE: 78 MMHG | RESPIRATION RATE: 14 BRPM | HEART RATE: 69 BPM | TEMPERATURE: 98.9 F | WEIGHT: 222 LBS

## 2020-02-14 DIAGNOSIS — I10 PRIMARY HYPERTENSION: ICD-10-CM

## 2020-02-14 DIAGNOSIS — Z99.89 OSA ON CPAP: ICD-10-CM

## 2020-02-14 DIAGNOSIS — G47.33 OSA ON CPAP: ICD-10-CM

## 2020-02-14 DIAGNOSIS — R73.03 PREDIABETES: Primary | ICD-10-CM

## 2020-02-14 DIAGNOSIS — E78.5 HYPERLIPIDEMIA, UNSPECIFIED HYPERLIPIDEMIA TYPE: ICD-10-CM

## 2020-02-14 DIAGNOSIS — E66.01 MORBID OBESITY DUE TO EXCESS CALORIES (HCC): ICD-10-CM

## 2020-02-14 NOTE — PROGRESS NOTES
Roxann Rivera presents today for   Chief Complaint   Patient presents with    Cholesterol Problem     4 month follow up with labs              Depression Screening:  3 most recent PHQ Screens 1/2/2019   Little interest or pleasure in doing things Not at all   Feeling down, depressed, irritable, or hopeless Not at all   Total Score PHQ 2 0       Learning Assessment:  Learning Assessment 6/5/2014   PRIMARY LEARNER Patient   HIGHEST LEVEL OF EDUCATION - PRIMARY LEARNER  DID NOT GRADUATE HIGH SCHOOL   BARRIERS PRIMARY LEARNER NONE   CO-LEARNER CAREGIVER No   PRIMARY LANGUAGE ENGLISH   LEARNER PREFERENCE PRIMARY DEMONSTRATION     LISTENING     PICTURES     READING     VIDEOS   ANSWERED BY patient   RELATIONSHIP SELF       Abuse Screening:  Abuse Screening Questionnaire 6/14/2019   Do you ever feel afraid of your partner? N   Are you in a relationship with someone who physically or mentally threatens you? N   Is it safe for you to go home? Y       Fall Risk  No flowsheet data found. Health Maintenance Due   Topic Date Due    Shingrix Vaccine Age 49> (1 of 2) 04/06/2012       Coordination of Care:  1. Have you been to the ER, urgent care clinic since your last visit? Hospitalized since your last visit? no    2. Have you seen or consulted any other health care providers outside of the [address] [place] [address] since your last visit? Include any pap smears or colon screening.  no

## 2020-05-21 DIAGNOSIS — I10 PRIMARY HYPERTENSION: ICD-10-CM

## 2020-05-21 NOTE — TELEPHONE ENCOUNTER
Last Visit: 2/14/20 with MD Samuel Arias  Next Appointment: 8/14/20 with MD Samuel Arias  Previous Refill Encounter(s): 10/22/19 #30 with 5 refills    Requested Prescriptions     Pending Prescriptions Disp Refills    triamterene-hydroCHLOROthiazide (DYAZIDE) 37.5-25 mg per capsule 90 Cap 3     Sig: Take 1 Cap by mouth daily.

## 2020-05-22 RX ORDER — TRIAMTERENE AND HYDROCHLOROTHIAZIDE 37.5; 25 MG/1; MG/1
1 CAPSULE ORAL DAILY
Qty: 90 CAP | Refills: 3 | Status: SHIPPED | OUTPATIENT
Start: 2020-05-22 | End: 2021-04-23 | Stop reason: SDUPTHER

## 2020-06-23 ENCOUNTER — OFFICE VISIT (OUTPATIENT)
Dept: NEUROLOGY | Age: 60
End: 2020-06-23

## 2020-06-23 VITALS
OXYGEN SATURATION: 95 % | DIASTOLIC BLOOD PRESSURE: 80 MMHG | HEIGHT: 66 IN | WEIGHT: 227 LBS | TEMPERATURE: 99 F | BODY MASS INDEX: 36.48 KG/M2 | RESPIRATION RATE: 18 BRPM | SYSTOLIC BLOOD PRESSURE: 130 MMHG | HEART RATE: 64 BPM

## 2020-06-23 DIAGNOSIS — I10 ESSENTIAL HYPERTENSION: ICD-10-CM

## 2020-06-23 DIAGNOSIS — E66.01 MORBID OBESITY (HCC): ICD-10-CM

## 2020-06-23 DIAGNOSIS — G47.33 OSA (OBSTRUCTIVE SLEEP APNEA): Primary | ICD-10-CM

## 2020-06-23 DIAGNOSIS — F51.12 INSUFFICIENT SLEEP SYNDROME: ICD-10-CM
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
## 2020-06-23 NOTE — PROGRESS NOTES
Tobi Croft presents today for   Chief Complaint   Patient presents with    Sleep Problem     follow up Download printed       Is someone accompanying this pt? no    Is the patient using any DME equipment during OV? ABC    Depression Screening:  3 most recent PHQ Screens 6/23/2020   Little interest or pleasure in doing things Not at all   Feeling down, depressed, irritable, or hopeless Not at all   Total Score PHQ 2 0       Learning Assessment:  Learning Assessment 6/5/2014   PRIMARY LEARNER Patient   HIGHEST LEVEL OF EDUCATION - PRIMARY LEARNER  DID NOT GRADUATE HIGH SCHOOL   BARRIERS PRIMARY LEARNER NONE   CO-LEARNER CAREGIVER No   PRIMARY LANGUAGE ENGLISH   LEARNER PREFERENCE PRIMARY DEMONSTRATION     LISTENING     PICTURES     READING     VIDEOS   ANSWERED BY patient   RELATIONSHIP SELF       Abuse Screening:  Abuse Screening Questionnaire 2/14/2020   Do you ever feel afraid of your partner? N   Are you in a relationship with someone who physically or mentally threatens you? N   Is it safe for you to go home? Y       Fall Risk  No flowsheet data found. Coordination of Care:  1. Have you been to the ER, urgent care clinic since your last visit? Hospitalized since your last visit? no    2. Have you seen or consulted any other health care providers outside of the [address] [place] [address] since your last visit? Include any pap smears or colon screening.  no

## 2020-06-23 NOTE — PATIENT INSTRUCTIONS
Thank you for choosing [address] [place] [address] and [address] [place] [address] [facility] for your  
 
care. You may receive a survey about your visit. We appreciate you taking time  
 
to complete this survey as we use your feedback to improve our services. We  
 
realize we are not perfect, but we strive to provide excellent care.

## 2020-08-07 ENCOUNTER — HOSPITAL ENCOUNTER (OUTPATIENT)
Dept: LAB | Age: 60
Discharge: HOME | End: 2020-08-07
Payer: COMMERCIAL

## 2020-08-07 DIAGNOSIS — R73.03 PREDIABETES: ICD-10-CM

## 2020-08-07 DIAGNOSIS — I10 PRIMARY HYPERTENSION: ICD-10-CM

## 2020-08-07 LAB
ALBUMIN SERPL-MCNC: 3.9 G/DL (ref 3.4–5)
ALBUMIN/GLOB SERPL: 1.1 {RATIO} (ref 0.8–1.7)
ALP SERPL-CCNC: 53 U/L (ref 45–117)
ALT SERPL-CCNC: 73 U/L (ref 16–61)
ANION GAP SERPL CALC-SCNC: 5 MMOL/L (ref 3–18)
APPEARANCE UR: CLEAR
AST SERPL-CCNC: 51 U/L (ref 10–38)
BILIRUB SERPL-MCNC: 0.5 MG/DL (ref 0.2–1)
BILIRUB UR QL: NEGATIVE
BUN SERPL-MCNC: 10 MG/DL (ref 7–18)
BUN/CREAT SERPL: 10 (ref 12–20)
CALCIUM SERPL-MCNC: 9.2 MG/DL (ref 8.5–10.1)
CHLORIDE SERPL-SCNC: 104 MMOL/L (ref 100–111)
CHOLEST SERPL-MCNC: 176 MG/DL
CO2 SERPL-SCNC: 30 MMOL/L (ref 21–32)
COLOR UR: YELLOW
CREAT SERPL-MCNC: 0.97 MG/DL (ref 0.6–1.3)
GLOBULIN SER CALC-MCNC: 3.7 G/DL (ref 2–4)
GLUCOSE SERPL-MCNC: 97 MG/DL (ref 74–99)
GLUCOSE UR STRIP.AUTO-MCNC: NEGATIVE MG/DL
HBA1C MFR BLD: 5.5 % (ref 4.2–5.6)
HDLC SERPL-MCNC: 51 MG/DL (ref 40–60)
HDLC SERPL: 3.5 {RATIO} (ref 0–5)
HGB UR QL STRIP: NEGATIVE
KETONES UR QL STRIP.AUTO: NEGATIVE MG/DL
LDLC SERPL CALC-MCNC: 103.8 MG/DL (ref 0–100)
LEUKOCYTE ESTERASE UR QL STRIP.AUTO: NEGATIVE
LIPID PROFILE,FLP: ABNORMAL
NITRITE UR QL STRIP.AUTO: NEGATIVE
PH UR STRIP: 5 [PH] (ref 5–8)
POTASSIUM SERPL-SCNC: 4 MMOL/L (ref 3.5–5.5)
PROT SERPL-MCNC: 7.6 G/DL (ref 6.4–8.2)
PROT UR STRIP-MCNC: NEGATIVE MG/DL
PSA SERPL-MCNC: 0.7 NG/ML (ref 0–4)
SODIUM SERPL-SCNC: 139 MMOL/L (ref 136–145)
SP GR UR REFRACTOMETRY: 1.02 (ref 1–1.03)
TRIGL SERPL-MCNC: 106 MG/DL (ref ?–150)
TSH SERPL DL<=0.05 MIU/L-ACNC: 1.83 UIU/ML (ref 0.36–3.74)
UROBILINOGEN UR QL STRIP.AUTO: 0.2 EU/DL (ref 0.2–1)
VLDLC SERPL CALC-MCNC: 21.2 MG/DL

## 2020-08-07 PROCEDURE — 84153 ASSAY OF PSA TOTAL: CPT

## 2020-08-07 PROCEDURE — 36415 COLL VENOUS BLD VENIPUNCTURE: CPT

## 2020-08-07 PROCEDURE — 83036 HEMOGLOBIN GLYCOSYLATED A1C: CPT

## 2020-08-07 PROCEDURE — 81003 URINALYSIS AUTO W/O SCOPE: CPT

## 2020-08-07 PROCEDURE — 80053 COMPREHEN METABOLIC PANEL: CPT

## 2020-08-07 PROCEDURE — 80061 LIPID PANEL: CPT

## 2020-08-07 PROCEDURE — 84443 ASSAY THYROID STIM HORMONE: CPT
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
## 2020-08-14 ENCOUNTER — OFFICE VISIT (OUTPATIENT)
Dept: INTERNAL MEDICINE CLINIC | Age: 60
End: 2020-08-14

## 2020-08-14 DIAGNOSIS — I10 PRIMARY HYPERTENSION: ICD-10-CM

## 2020-08-14 DIAGNOSIS — E66.01 MORBID OBESITY DUE TO EXCESS CALORIES (HCC): ICD-10-CM

## 2020-08-14 DIAGNOSIS — K57.90 DIVERTICULOSIS: ICD-10-CM

## 2020-08-14 DIAGNOSIS — E78.5 HYPERLIPIDEMIA, UNSPECIFIED HYPERLIPIDEMIA TYPE: Primary | ICD-10-CM

## 2020-08-14 DIAGNOSIS — R73.01 IFG (IMPAIRED FASTING GLUCOSE): ICD-10-CM

## 2020-08-14 DIAGNOSIS — G47.33 OSA ON CPAP: ICD-10-CM

## 2020-08-14 DIAGNOSIS — Z99.89 OSA ON CPAP: ICD-10-CM

## 2020-08-15 VITALS
SYSTOLIC BLOOD PRESSURE: 134 MMHG | TEMPERATURE: 97 F | DIASTOLIC BLOOD PRESSURE: 83 MMHG | RESPIRATION RATE: 14 BRPM | HEIGHT: 66 IN | HEART RATE: 76 BPM | OXYGEN SATURATION: 97 % | WEIGHT: 230 LBS | BODY MASS INDEX: 36.96 KG/M2

## 2020-08-24 ENCOUNTER — HOSPITAL ENCOUNTER (OUTPATIENT)
Dept: CT IMAGING | Age: 60
Discharge: HOME | End: 2020-08-24
Attending: INTERNAL MEDICINE
Payer: SELF-PAY

## 2020-08-24 DIAGNOSIS — E78.5 HYPERLIPIDEMIA, UNSPECIFIED HYPERLIPIDEMIA TYPE: ICD-10-CM

## 2020-08-24 PROCEDURE — 75571 CT HRT W/O DYE W/CA TEST: CPT

## 2020-08-25 ENCOUNTER — OFFICE VISIT (OUTPATIENT)
Dept: NEUROLOGY | Age: 60
End: 2020-08-25

## 2020-08-25 VITALS
RESPIRATION RATE: 18 BRPM | OXYGEN SATURATION: 95 % | WEIGHT: 227 LBS | HEART RATE: 77 BPM | HEIGHT: 66 IN | SYSTOLIC BLOOD PRESSURE: 120 MMHG | TEMPERATURE: 97.5 F | DIASTOLIC BLOOD PRESSURE: 70 MMHG | BODY MASS INDEX: 36.48 KG/M2

## 2020-08-25 DIAGNOSIS — E66.01 MORBID OBESITY (HCC): ICD-10-CM

## 2020-08-25 DIAGNOSIS — I10 ESSENTIAL HYPERTENSION: ICD-10-CM

## 2020-08-25 DIAGNOSIS — G47.33 OSA (OBSTRUCTIVE SLEEP APNEA): Primary | ICD-10-CM
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
## 2020-08-25 NOTE — PROGRESS NOTES
Bibiana Fleming presents today for   Chief Complaint   Patient presents with    Sleep Problem     follow up Down load       Is someone accompanying this pt? No    Is the patient using any DME equipment during OV? ABC    Depression Screening:  3 most recent PHQ Screens 6/23/2020   Little interest or pleasure in doing things Not at all   Feeling down, depressed, irritable, or hopeless Not at all   Total Score PHQ 2 0       Learning Assessment:  Learning Assessment 6/5/2014   PRIMARY LEARNER Patient   HIGHEST LEVEL OF EDUCATION - PRIMARY LEARNER  DID NOT GRADUATE HIGH SCHOOL   BARRIERS PRIMARY LEARNER NONE   CO-LEARNER CAREGIVER No   PRIMARY LANGUAGE ENGLISH   LEARNER PREFERENCE PRIMARY DEMONSTRATION     LISTENING     PICTURES     READING     VIDEOS   ANSWERED BY patient   RELATIONSHIP SELF       Abuse Screening:  Abuse Screening Questionnaire 2/14/2020   Do you ever feel afraid of your partner? N   Are you in a relationship with someone who physically or mentally threatens you? N   Is it safe for you to go home? Y       Fall Risk  No flowsheet data found. Coordination of Care:  1. Have you been to the ER, urgent care clinic since your last visit? Hospitalized since your last visit? no    2. Have you seen or consulted any other health care providers outside of the [address] [place] [address] since your last visit? Include any pap smears or colon screening.  no

## 2020-08-31 ENCOUNTER — TELEPHONE (OUTPATIENT)
Dept: INTERNAL MEDICINE CLINIC | Age: 60
End: 2020-08-31

## 2020-09-01 NOTE — TELEPHONE ENCOUNTER
Patient is aware per Dr. Adan Billing Coronary calcium Score 0, and he can hold off statin if wishes. Patient verbalizes understanding.

## 2020-12-14 ENCOUNTER — OFFICE VISIT (OUTPATIENT)
Dept: NEUROLOGY | Age: 60
End: 2020-12-14
Payer: COMMERCIAL

## 2020-12-14 VITALS
WEIGHT: 237.6 LBS | HEIGHT: 66 IN | RESPIRATION RATE: 16 BRPM | TEMPERATURE: 97.2 F | HEART RATE: 76 BPM | BODY MASS INDEX: 38.18 KG/M2 | OXYGEN SATURATION: 96 % | DIASTOLIC BLOOD PRESSURE: 82 MMHG | SYSTOLIC BLOOD PRESSURE: 122 MMHG

## 2020-12-14 DIAGNOSIS — G47.33 OSA (OBSTRUCTIVE SLEEP APNEA): Primary | ICD-10-CM

## 2020-12-14 PROCEDURE — 99213 OFFICE O/P EST LOW 20 MIN: CPT | Performed by: NURSE PRACTITIONER

## 2020-12-14 NOTE — PROGRESS NOTES
Sherman Patino is a 64 y.o. male who is in the office for a follow up. 1. Have you been to the ER, urgent care clinic since your last visit? Hospitalized since your last visit? No    2. Have you seen or consulted any other health care providers outside of the [address] [place] [address] since your last visit? Include any pap smears or colon screening.  No
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
## 2021-02-12 ENCOUNTER — APPOINTMENT (OUTPATIENT)
Dept: INTERNAL MEDICINE CLINIC | Age: 61
End: 2021-02-12

## 2021-02-12 ENCOUNTER — HOSPITAL ENCOUNTER (OUTPATIENT)
Dept: LAB | Age: 61
Discharge: HOME | End: 2021-02-12
Payer: COMMERCIAL

## 2021-02-12 LAB
ANION GAP SERPL CALC-SCNC: 3 MMOL/L (ref 3–18)
BUN SERPL-MCNC: 12 MG/DL (ref 7–18)
BUN/CREAT SERPL: 12 (ref 12–20)
CALCIUM SERPL-MCNC: 9.1 MG/DL (ref 8.5–10.1)
CHLORIDE SERPL-SCNC: 105 MMOL/L (ref 100–111)
CHOLEST SERPL-MCNC: 193 MG/DL
CO2 SERPL-SCNC: 30 MMOL/L (ref 21–32)
CREAT SERPL-MCNC: 0.97 MG/DL (ref 0.6–1.3)
EST. AVERAGE GLUCOSE BLD GHB EST-MCNC: 117 MG/DL
GLUCOSE SERPL-MCNC: 100 MG/DL (ref 74–99)
HBA1C MFR BLD: 5.7 % (ref 4.2–5.6)
HDLC SERPL-MCNC: 43 MG/DL (ref 40–60)
HDLC SERPL: 4.5 {RATIO} (ref 0–5)
LDLC SERPL CALC-MCNC: 116.6 MG/DL (ref 0–100)
LIPID PROFILE,FLP: ABNORMAL
POTASSIUM SERPL-SCNC: 4.2 MMOL/L (ref 3.5–5.5)
SODIUM SERPL-SCNC: 138 MMOL/L (ref 136–145)
TRIGL SERPL-MCNC: 167 MG/DL (ref ?–150)
VLDLC SERPL CALC-MCNC: 33.4 MG/DL

## 2021-02-12 PROCEDURE — 83036 HEMOGLOBIN GLYCOSYLATED A1C: CPT

## 2021-02-12 PROCEDURE — 80048 BASIC METABOLIC PNL TOTAL CA: CPT

## 2021-02-12 PROCEDURE — 80061 LIPID PANEL: CPT

## 2021-02-12 PROCEDURE — 36415 COLL VENOUS BLD VENIPUNCTURE: CPT
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
## 2021-02-19 ENCOUNTER — OFFICE VISIT (OUTPATIENT)
Dept: INTERNAL MEDICINE CLINIC | Age: 61
End: 2021-02-19
Payer: COMMERCIAL

## 2021-02-19 VITALS
WEIGHT: 237 LBS | SYSTOLIC BLOOD PRESSURE: 134 MMHG | RESPIRATION RATE: 14 BRPM | OXYGEN SATURATION: 98 % | DIASTOLIC BLOOD PRESSURE: 85 MMHG | TEMPERATURE: 96.8 F | HEART RATE: 81 BPM | HEIGHT: 66 IN | BODY MASS INDEX: 38.09 KG/M2

## 2021-02-19 DIAGNOSIS — I10 PRIMARY HYPERTENSION: Primary | ICD-10-CM

## 2021-02-19 DIAGNOSIS — Z00.00 PHYSICAL EXAM: ICD-10-CM

## 2021-02-19 DIAGNOSIS — Z99.89 OSA ON CPAP: ICD-10-CM

## 2021-02-19 DIAGNOSIS — E78.5 HYPERLIPIDEMIA, UNSPECIFIED HYPERLIPIDEMIA TYPE: ICD-10-CM

## 2021-02-19 DIAGNOSIS — R73.01 IFG (IMPAIRED FASTING GLUCOSE): ICD-10-CM

## 2021-02-19 DIAGNOSIS — G47.33 OSA ON CPAP: ICD-10-CM

## 2021-02-19 DIAGNOSIS — E66.01 MORBID OBESITY (HCC): ICD-10-CM

## 2021-02-19 DIAGNOSIS — K57.90 DIVERTICULOSIS: ICD-10-CM

## 2021-02-19 PROCEDURE — 99214 OFFICE O/P EST MOD 30 MIN: CPT | Performed by: INTERNAL MEDICINE

## 2021-02-19 NOTE — PROGRESS NOTES
Lul Adrian presents today for   Chief Complaint   Patient presents with    Hypertension     6 month f/u with labs               Depression Screening:  3 most recent PHQ Screens 2/19/2021   Little interest or pleasure in doing things Not at all   Feeling down, depressed, irritable, or hopeless Not at all   Total Score PHQ 2 0       Learning Assessment:  Learning Assessment 6/5/2014   PRIMARY LEARNER Patient   HIGHEST LEVEL OF EDUCATION - PRIMARY LEARNER  DID NOT GRADUATE HIGH SCHOOL   BARRIERS PRIMARY LEARNER NONE   CO-LEARNER CAREGIVER No   PRIMARY LANGUAGE ENGLISH   LEARNER PREFERENCE PRIMARY DEMONSTRATION     LISTENING     PICTURES     READING     VIDEOS   ANSWERED BY patient   RELATIONSHIP SELF       Abuse Screening:  Abuse Screening Questionnaire 2/14/2020   Do you ever feel afraid of your partner? N   Are you in a relationship with someone who physically or mentally threatens you? N   Is it safe for you to go home? Y       Fall Risk  No flowsheet data found. Coordination of Care:  1. Have you been to the ER, urgent care clinic since your last visit? Hospitalized since your last visit? no    2. Have you seen or consulted any other health care providers outside of the [address] [place] [address] since your last visit? Include any pap smears or colon screening.  no

## 2021-04-23 DIAGNOSIS — I10 PRIMARY HYPERTENSION: ICD-10-CM

## 2021-04-23 NOTE — TELEPHONE ENCOUNTER
Last Visit: 2/19/21 with MD Frankie Velazquez  Next Appointment: 8/20/21 with MD Frankie Velazquez  Previous Refill Encounter(s): 5/22/20 #90 with 3 refills    Requested Prescriptions     Pending Prescriptions Disp Refills    triamterene-hydroCHLOROthiazide (DYAZIDE) 37.5-25 mg per capsule 90 Cap 3     Sig: Take 1 Cap by mouth daily.

## 2021-04-26 RX ORDER — TRIAMTERENE AND HYDROCHLOROTHIAZIDE 37.5; 25 MG/1; MG/1
1 CAPSULE ORAL DAILY
Qty: 90 CAP | Refills: 3 | Status: SHIPPED | OUTPATIENT
Start: 2021-04-26 | End: 2022-04-29

## 2021-06-01 ENCOUNTER — HOSPITAL ENCOUNTER (OUTPATIENT)
Dept: LAB | Age: 61
Discharge: HOME | End: 2021-06-01
Payer: COMMERCIAL

## 2021-06-01 LAB
ANION GAP SERPL CALC-SCNC: 4 MMOL/L (ref 3–18)
BUN SERPL-MCNC: 12 MG/DL (ref 7–18)
BUN/CREAT SERPL: 15 (ref 12–20)
CALCIUM SERPL-MCNC: 9.1 MG/DL (ref 8.5–10.1)
CHLORIDE SERPL-SCNC: 109 MMOL/L (ref 100–111)
CO2 SERPL-SCNC: 28 MMOL/L (ref 21–32)
CREAT SERPL-MCNC: 0.82 MG/DL (ref 0.6–1.3)
GLUCOSE SERPL-MCNC: 93 MG/DL (ref 74–99)
POTASSIUM SERPL-SCNC: 4.3 MMOL/L (ref 3.5–5.5)
SODIUM SERPL-SCNC: 141 MMOL/L (ref 136–145)

## 2021-06-01 PROCEDURE — 80048 BASIC METABOLIC PNL TOTAL CA: CPT

## 2021-06-01 PROCEDURE — 36415 COLL VENOUS BLD VENIPUNCTURE: CPT

## 2021-08-13 ENCOUNTER — HOSPITAL ENCOUNTER (OUTPATIENT)
Dept: LAB | Age: 61
Discharge: HOME | End: 2021-08-13
Payer: COMMERCIAL

## 2021-08-13 ENCOUNTER — APPOINTMENT (OUTPATIENT)
Dept: INTERNAL MEDICINE CLINIC | Age: 61
End: 2021-08-13

## 2021-08-13 DIAGNOSIS — R73.01 IFG (IMPAIRED FASTING GLUCOSE): ICD-10-CM

## 2021-08-13 DIAGNOSIS — Z00.00 PHYSICAL EXAM: ICD-10-CM

## 2021-08-13 LAB
ERYTHROCYTE [DISTWIDTH] IN BLOOD BY AUTOMATED COUNT: 12 % (ref 11.6–14.5)
HBA1C MFR BLD: 6.3 % (ref 4.2–5.6)
HCT VFR BLD AUTO: 42.5 % (ref 36–48)
HGB BLD-MCNC: 12.8 G/DL (ref 13–16)
MCH RBC QN AUTO: 27.4 PG (ref 24–34)
MCHC RBC AUTO-ENTMCNC: 30.1 G/DL (ref 31–37)
MCV RBC AUTO: 91 FL (ref 74–97)
PLATELET # BLD AUTO: 236 K/UL (ref 135–420)
PMV BLD AUTO: 11.5 FL (ref 9.2–11.8)
PSA SERPL-MCNC: 0.5 NG/ML (ref 0–4)
RBC # BLD AUTO: 4.67 M/UL (ref 4.35–5.65)
WBC # BLD AUTO: 6 K/UL (ref 4.6–13.2)

## 2021-08-13 PROCEDURE — 36415 COLL VENOUS BLD VENIPUNCTURE: CPT

## 2021-08-13 PROCEDURE — 85027 COMPLETE CBC AUTOMATED: CPT

## 2021-08-13 PROCEDURE — 83036 HEMOGLOBIN GLYCOSYLATED A1C: CPT

## 2021-08-13 PROCEDURE — 84153 ASSAY OF PSA TOTAL: CPT
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
## 2021-08-20 ENCOUNTER — OFFICE VISIT (OUTPATIENT)
Dept: INTERNAL MEDICINE CLINIC | Age: 61
End: 2021-08-20
Payer: COMMERCIAL

## 2021-08-20 VITALS
HEIGHT: 66 IN | TEMPERATURE: 97.7 F | DIASTOLIC BLOOD PRESSURE: 75 MMHG | SYSTOLIC BLOOD PRESSURE: 137 MMHG | WEIGHT: 244 LBS | HEART RATE: 60 BPM | OXYGEN SATURATION: 97 % | RESPIRATION RATE: 16 BRPM | BODY MASS INDEX: 39.21 KG/M2

## 2021-08-20 DIAGNOSIS — E78.5 HYPERLIPIDEMIA, UNSPECIFIED HYPERLIPIDEMIA TYPE: ICD-10-CM

## 2021-08-20 DIAGNOSIS — I10 PRIMARY HYPERTENSION: Primary | ICD-10-CM

## 2021-08-20 DIAGNOSIS — G47.33 OSA ON CPAP: ICD-10-CM

## 2021-08-20 DIAGNOSIS — R73.01 IFG (IMPAIRED FASTING GLUCOSE): ICD-10-CM

## 2021-08-20 DIAGNOSIS — K57.90 DIVERTICULOSIS: ICD-10-CM

## 2021-08-20 DIAGNOSIS — Z99.89 OSA ON CPAP: ICD-10-CM

## 2021-08-20 PROCEDURE — 99214 OFFICE O/P EST MOD 30 MIN: CPT | Performed by: INTERNAL MEDICINE

## 2021-08-20 NOTE — PROGRESS NOTES
Depression Screening:  3 most recent PHQ Screens 8/20/2021   Little interest or pleasure in doing things Not at all   Feeling down, depressed, irritable, or hopeless -   Total Score PHQ 2 -       Learning Assessment:  Learning Assessment 6/5/2014   PRIMARY LEARNER Patient   HIGHEST LEVEL OF EDUCATION - PRIMARY LEARNER  DID NOT GRADUATE HIGH SCHOOL   BARRIERS PRIMARY LEARNER NONE   CO-LEARNER CAREGIVER No   PRIMARY LANGUAGE ENGLISH   LEARNER PREFERENCE PRIMARY DEMONSTRATION     LISTENING     PICTURES     READING     VIDEOS   ANSWERED BY patient   RELATIONSHIP SELF       Abuse Screening:  Abuse Screening Questionnaire 2/14/2020   Do you ever feel afraid of your partner? N   Are you in a relationship with someone who physically or mentally threatens you? N   Is it safe for you to go home? Y       Fall Risk  No flowsheet data found. ADL  No flowsheet data found. Travel Screening:    Travel Screening     Question   Response    In the last month, have you been in contact with someone who was confirmed or suspected to have Coronavirus / COVID-19? No / Unsure    Have you had a COVID-19 viral test in the last 14 days? No    Do you have any of the following new or worsening symptoms? None of these    Have you traveled internationally or domestically in the last month? No      Travel History   Travel since 07/20/21     No documented travel since 07/20/21          Health Maintenance reviewed and discussed and ordered per Provider. Health Maintenance Due   Topic Date Due    COVID-19 Vaccine (1) Never done    Shingrix Vaccine Age 50> (1 of 2) Never done   . Lynn Carr presents today for   Chief Complaint   Patient presents with    Results       Is someone accompanying this pt? no    Is the patient using any DME equipment during OV? no    Coordination of Care:  1. Have you been to the ER, urgent care clinic since your last visit? Hospitalized since your last visit? no    2.  Have you seen or consulted any other health care providers outside of the [address] [place] [address] since your last visit? Include any pap smears or colon screening.  no

## 2022-02-15 ENCOUNTER — HOSPITAL ENCOUNTER (OUTPATIENT)
Dept: LAB | Age: 62
Discharge: HOME | End: 2022-02-15
Payer: COMMERCIAL

## 2022-02-15 ENCOUNTER — APPOINTMENT (OUTPATIENT)
Dept: INTERNAL MEDICINE CLINIC | Age: 62
End: 2022-02-15

## 2022-02-15 DIAGNOSIS — R73.01 IFG (IMPAIRED FASTING GLUCOSE): ICD-10-CM

## 2022-02-15 DIAGNOSIS — E78.5 HYPERLIPIDEMIA, UNSPECIFIED HYPERLIPIDEMIA TYPE: ICD-10-CM

## 2022-02-15 LAB
ALBUMIN SERPL-MCNC: 4.2 G/DL (ref 3.4–5)
ALBUMIN/GLOB SERPL: 1.2 {RATIO} (ref 0.8–1.7)
ALP SERPL-CCNC: 53 U/L (ref 45–117)
ALT SERPL-CCNC: 69 U/L (ref 16–61)
ANION GAP SERPL CALC-SCNC: 5 MMOL/L (ref 3–18)
AST SERPL-CCNC: 43 U/L (ref 10–38)
BILIRUB SERPL-MCNC: 0.6 MG/DL (ref 0.2–1)
BUN SERPL-MCNC: 13 MG/DL (ref 7–18)
BUN/CREAT SERPL: 13 (ref 12–20)
CALCIUM SERPL-MCNC: 9.6 MG/DL (ref 8.5–10.1)
CHLORIDE SERPL-SCNC: 106 MMOL/L (ref 100–111)
CHOLEST SERPL-MCNC: 177 MG/DL
CO2 SERPL-SCNC: 28 MMOL/L (ref 21–32)
CREAT SERPL-MCNC: 1.04 MG/DL (ref 0.6–1.3)
EST. AVERAGE GLUCOSE BLD GHB EST-MCNC: 126 MG/DL
GLOBULIN SER CALC-MCNC: 3.4 G/DL (ref 2–4)
GLUCOSE SERPL-MCNC: 102 MG/DL (ref 74–99)
HBA1C MFR BLD: 6 % (ref 4.2–5.6)
HDLC SERPL-MCNC: 43 MG/DL (ref 40–60)
HDLC SERPL: 4.1 {RATIO} (ref 0–5)
LDLC SERPL CALC-MCNC: 104.4 MG/DL (ref 0–100)
LIPID PROFILE,FLP: ABNORMAL
POTASSIUM SERPL-SCNC: 4.1 MMOL/L (ref 3.5–5.5)
PROT SERPL-MCNC: 7.6 G/DL (ref 6.4–8.2)
SODIUM SERPL-SCNC: 139 MMOL/L (ref 136–145)
TRIGL SERPL-MCNC: 148 MG/DL (ref ?–150)
VLDLC SERPL CALC-MCNC: 29.6 MG/DL

## 2022-02-15 PROCEDURE — 80061 LIPID PANEL: CPT

## 2022-02-15 PROCEDURE — 36415 COLL VENOUS BLD VENIPUNCTURE: CPT

## 2022-02-15 PROCEDURE — 80053 COMPREHEN METABOLIC PANEL: CPT

## 2022-02-15 PROCEDURE — 83036 HEMOGLOBIN GLYCOSYLATED A1C: CPT
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
## 2022-02-22 ENCOUNTER — OFFICE VISIT (OUTPATIENT)
Dept: INTERNAL MEDICINE CLINIC | Age: 62
End: 2022-02-22
Payer: COMMERCIAL

## 2022-02-22 VITALS
TEMPERATURE: 97.6 F | BODY MASS INDEX: 37.77 KG/M2 | DIASTOLIC BLOOD PRESSURE: 72 MMHG | WEIGHT: 235 LBS | SYSTOLIC BLOOD PRESSURE: 128 MMHG | HEART RATE: 64 BPM | OXYGEN SATURATION: 95 % | HEIGHT: 66 IN | RESPIRATION RATE: 16 BRPM

## 2022-02-22 DIAGNOSIS — G47.33 OSA ON CPAP: ICD-10-CM

## 2022-02-22 DIAGNOSIS — E78.5 HYPERLIPIDEMIA, UNSPECIFIED HYPERLIPIDEMIA TYPE: ICD-10-CM

## 2022-02-22 DIAGNOSIS — I10 PRIMARY HYPERTENSION: Primary | ICD-10-CM

## 2022-02-22 DIAGNOSIS — Z12.5 SCREENING FOR MALIGNANT NEOPLASM OF PROSTATE: ICD-10-CM

## 2022-02-22 DIAGNOSIS — R73.01 IFG (IMPAIRED FASTING GLUCOSE): ICD-10-CM

## 2022-02-22 DIAGNOSIS — E66.01 MORBID OBESITY DUE TO EXCESS CALORIES (HCC): ICD-10-CM

## 2022-02-22 DIAGNOSIS — Z99.89 OSA ON CPAP: ICD-10-CM

## 2022-02-22 PROCEDURE — 99214 OFFICE O/P EST MOD 30 MIN: CPT | Performed by: INTERNAL MEDICINE

## 2022-02-22 NOTE — PROGRESS NOTES
Jazmine Hicks presents with   Chief Complaint   Patient presents with    Follow-up     6 month    Hypertension    Labs     2-15-22            1. \"Have you been to the ER, urgent care clinic since your last visit? Hospitalized since your last visit? \" NO    2. \"Have you seen or consulted any other health care providers outside of the [address] [place] [address] since your last visit? \" Sunni Green for vaccine on 9-13-22    3. For patients aged 39-70: Has the patient had a colonoscopy? Yes - no Care Gap present     If the patient is female:    4. For patients aged 41-77: Has the patient had a mammogram within the past 2 years? NA - based on age    11. For patients aged 21-65: Has the patient had a pap smear?  NA - based on age

## 2022-03-18 PROBLEM — E78.5 HYPERLIPIDEMIA: Status: ACTIVE | Noted: 2019-06-14

## 2022-03-18 PROBLEM — I10 PRIMARY HYPERTENSION: Status: ACTIVE | Noted: 2019-06-14

## 2022-03-19 PROBLEM — J30.89 CHRONIC NON-SEASONAL ALLERGIC RHINITIS: Status: ACTIVE | Noted: 2017-12-28

## 2022-03-19 PROBLEM — E66.01 MORBID OBESITY DUE TO EXCESS CALORIES (HCC): Status: ACTIVE | Noted: 2017-12-28

## 2022-03-19 PROBLEM — K57.90 DIVERTICULOSIS: Status: ACTIVE | Noted: 2019-10-22

## 2022-03-20 PROBLEM — G47.33 OSA ON CPAP: Status: ACTIVE | Noted: 2019-10-22

## 2022-03-20 PROBLEM — Z99.89 OSA ON CPAP: Status: ACTIVE | Noted: 2019-10-22

## 2022-04-26 ENCOUNTER — TELEPHONE (OUTPATIENT)
Dept: INTERNAL MEDICINE CLINIC | Age: 62
End: 2022-04-26

## 2022-04-26 NOTE — TELEPHONE ENCOUNTER
Patient is complaining of right hand and elbow pain/swelling. Mostly the elbow. Stating the elbow is painful and tight. I have scheduled him for Monday but he wants to know if he can be fit in sooner.

## 2022-04-28 ENCOUNTER — OFFICE VISIT (OUTPATIENT)
Dept: INTERNAL MEDICINE CLINIC | Age: 62
End: 2022-04-28
Payer: COMMERCIAL

## 2022-04-28 VITALS
DIASTOLIC BLOOD PRESSURE: 78 MMHG | HEART RATE: 75 BPM | HEIGHT: 66 IN | SYSTOLIC BLOOD PRESSURE: 142 MMHG | OXYGEN SATURATION: 97 % | WEIGHT: 231 LBS | TEMPERATURE: 97.7 F | RESPIRATION RATE: 16 BRPM | BODY MASS INDEX: 37.12 KG/M2

## 2022-04-28 DIAGNOSIS — M70.21 OLECRANON BURSITIS, RIGHT ELBOW: Primary | ICD-10-CM

## 2022-04-28 PROCEDURE — 99213 OFFICE O/P EST LOW 20 MIN: CPT | Performed by: INTERNAL MEDICINE

## 2022-04-28 RX ORDER — MELOXICAM 15 MG/1
15 TABLET ORAL DAILY
Qty: 15 TABLET | Refills: 1 | Status: SHIPPED | OUTPATIENT
Start: 2022-04-28 | End: 2022-08-25 | Stop reason: ALTCHOICE

## 2022-04-28 NOTE — PROGRESS NOTES
Zenobia Colunga presents with   Chief Complaint   Patient presents with    Elbow Pain     X 5 days, pain started 4-23-22, swelling started 4-25-22, no injury,R elbow/hand pain and swelling                1. \"Have you been to the ER, urgent care clinic since your last visit? Hospitalized since your last visit? \" No    2. \"Have you seen or consulted any other health care providers outside of the [address] [place] [address] since your last visit? \" No     3. For patients aged 39-70: Has the patient had a colonoscopy / FIT/ Cologuard?  Yes - no Care Gap present
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
## 2022-04-29 DIAGNOSIS — I10 PRIMARY HYPERTENSION: ICD-10-CM

## 2022-04-29 RX ORDER — TRIAMTERENE AND HYDROCHLOROTHIAZIDE 37.5; 25 MG/1; MG/1
CAPSULE ORAL
Qty: 90 CAPSULE | Refills: 3 | Status: SHIPPED | OUTPATIENT
Start: 2022-04-29

## 2022-08-17 ENCOUNTER — HOSPITAL ENCOUNTER (OUTPATIENT)
Dept: LAB | Age: 62
Discharge: HOME | End: 2022-08-17
Payer: COMMERCIAL

## 2022-08-17 ENCOUNTER — APPOINTMENT (OUTPATIENT)
Dept: INTERNAL MEDICINE CLINIC | Age: 62
End: 2022-08-17

## 2022-08-17 DIAGNOSIS — Z12.5 SCREENING FOR MALIGNANT NEOPLASM OF PROSTATE: ICD-10-CM

## 2022-08-17 DIAGNOSIS — R73.01 IFG (IMPAIRED FASTING GLUCOSE): ICD-10-CM

## 2022-08-17 LAB
ERYTHROCYTE [DISTWIDTH] IN BLOOD BY AUTOMATED COUNT: 11.9 % (ref 11.6–14.5)
EST. AVERAGE GLUCOSE BLD GHB EST-MCNC: 131 MG/DL
HBA1C MFR BLD: 6.2 % (ref 4.2–5.6)
HCT VFR BLD AUTO: 41.8 % (ref 36–48)
HGB BLD-MCNC: 13 G/DL (ref 13–16)
MCH RBC QN AUTO: 27.9 PG (ref 24–34)
MCHC RBC AUTO-ENTMCNC: 31.1 G/DL (ref 31–37)
MCV RBC AUTO: 89.7 FL (ref 78–100)
NRBC # BLD: 0 K/UL (ref 0–0.01)
NRBC BLD-RTO: 0 PER 100 WBC
PLATELET # BLD AUTO: 257 K/UL (ref 135–420)
PMV BLD AUTO: 11.8 FL (ref 9.2–11.8)
PSA SERPL-MCNC: 0.6 NG/ML (ref 0–4)
RBC # BLD AUTO: 4.66 M/UL (ref 4.35–5.65)
WBC # BLD AUTO: 5.8 K/UL (ref 4.6–13.2)

## 2022-08-17 PROCEDURE — 85027 COMPLETE CBC AUTOMATED: CPT

## 2022-08-17 PROCEDURE — 36415 COLL VENOUS BLD VENIPUNCTURE: CPT

## 2022-08-17 PROCEDURE — 84153 ASSAY OF PSA TOTAL: CPT

## 2022-08-17 PROCEDURE — 83036 HEMOGLOBIN GLYCOSYLATED A1C: CPT
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
## 2022-08-24 ENCOUNTER — OFFICE VISIT (OUTPATIENT)
Dept: INTERNAL MEDICINE CLINIC | Age: 62
End: 2022-08-24
Payer: COMMERCIAL

## 2022-08-24 VITALS
DIASTOLIC BLOOD PRESSURE: 89 MMHG | WEIGHT: 236 LBS | HEART RATE: 86 BPM | SYSTOLIC BLOOD PRESSURE: 139 MMHG | HEIGHT: 66 IN | OXYGEN SATURATION: 96 % | BODY MASS INDEX: 37.93 KG/M2

## 2022-08-24 DIAGNOSIS — R73.01 IFG (IMPAIRED FASTING GLUCOSE): ICD-10-CM

## 2022-08-24 DIAGNOSIS — M19.90 OSTEOARTHRITIS, UNSPECIFIED OSTEOARTHRITIS TYPE, UNSPECIFIED SITE: ICD-10-CM

## 2022-08-24 DIAGNOSIS — E66.01 MORBID OBESITY DUE TO EXCESS CALORIES (HCC): ICD-10-CM

## 2022-08-24 DIAGNOSIS — I10 PRIMARY HYPERTENSION: Primary | ICD-10-CM

## 2022-08-24 DIAGNOSIS — M79.645 THUMB PAIN, LEFT: ICD-10-CM

## 2022-08-24 DIAGNOSIS — E78.5 HYPERLIPIDEMIA, UNSPECIFIED HYPERLIPIDEMIA TYPE: ICD-10-CM

## 2022-08-24 DIAGNOSIS — K57.90 DIVERTICULOSIS: ICD-10-CM

## 2022-08-24 PROCEDURE — 99214 OFFICE O/P EST MOD 30 MIN: CPT | Performed by: INTERNAL MEDICINE

## 2022-08-24 NOTE — PROGRESS NOTES
Angelito Morrison is a 63 y.o. male (: 1960) presenting to address:    Chief Complaint   Patient presents with    Follow-up       Vitals:    22 1505   Weight: 236 lb (107 kg)   Height: 5' 6\" (1.676 m)   PainSc:   0 - No pain       Hearing/Vision:   No results found. Learning Assessment:     Learning Assessment 2014   PRIMARY LEARNER Patient   HIGHEST LEVEL OF EDUCATION - PRIMARY LEARNER  DID NOT GRADUATE HIGH SCHOOL   BARRIERS PRIMARY LEARNER NONE   CO-LEARNER CAREGIVER No   PRIMARY LANGUAGE ENGLISH   LEARNER PREFERENCE PRIMARY DEMONSTRATION     LISTENING     PICTURES     READING     VIDEOS   ANSWERED BY patient   RELATIONSHIP SELF     Depression Screening:     3 most recent PHQ Screens 2022   Little interest or pleasure in doing things Not at all   Feeling down, depressed, irritable, or hopeless Not at all   Total Score PHQ 2 0     Fall Risk Assessment:   No flowsheet data found. Abuse Screening:     Abuse Screening Questionnaire 2022   Do you ever feel afraid of your partner? N   Are you in a relationship with someone who physically or mentally threatens you? N   Is it safe for you to go home? Y     ADL Assessment:   No flowsheet data found. Coordination of Care Questionaire:   1. \"Have you been to the ER, urgent care clinic since your last visit? Hospitalized since your last visit? \" No    2. \"Have you seen or consulted any other health care providers outside of the [address] [place] [address] since your last visit? \" No     3. For patients aged 39-70: Has the patient had a colonoscopy / FIT/ Cologuard? Yes - no Care Gap present      If the patient is female:    4. For patients aged 41-77: Has the patient had a mammogram within the past 2 years? NA - based on age or sex  See top three    5. For patients aged 21-65: Has the patient had a pap smear? NA - based on age or sex    Advanced Directive:   1. Do you have an Advanced Directive? NO    2.  Would you like information on Advanced Directives?  NO

## 2022-08-25 PROBLEM — I10 PRIMARY HYPERTENSION: Status: RESOLVED | Noted: 2019-06-14 | Resolved: 2022-08-25

## 2022-10-06 ENCOUNTER — HOSPITAL ENCOUNTER (OUTPATIENT)
Dept: NUTRITION | Age: 62
Discharge: HOME | End: 2022-10-06
Payer: COMMERCIAL

## 2022-10-06 PROCEDURE — 97802 MEDICAL NUTRITION INDIV IN: CPT

## 2022-10-20 ENCOUNTER — OFFICE VISIT (OUTPATIENT)
Dept: ORTHOPEDIC SURGERY | Age: 62
End: 2022-10-20
Payer: COMMERCIAL

## 2022-10-20 VITALS — HEIGHT: 66 IN | BODY MASS INDEX: 37.28 KG/M2 | WEIGHT: 232 LBS | HEART RATE: 80 BPM | OXYGEN SATURATION: 95 %

## 2022-10-20 DIAGNOSIS — M25.532 LEFT WRIST PAIN: ICD-10-CM

## 2022-10-20 DIAGNOSIS — M65.4 DE QUERVAIN'S TENOSYNOVITIS, LEFT: Primary | ICD-10-CM

## 2022-10-20 PROCEDURE — 99203 OFFICE O/P NEW LOW 30 MIN: CPT | Performed by: ORTHOPAEDIC SURGERY

## 2022-10-20 PROCEDURE — 73110 X-RAY EXAM OF WRIST: CPT | Performed by: ORTHOPAEDIC SURGERY

## 2022-10-20 PROCEDURE — 20550 NJX 1 TENDON SHEATH/LIGAMENT: CPT | Performed by: ORTHOPAEDIC SURGERY

## 2022-10-20 NOTE — LETTER
10/20/2022    Patient: Cheyenne Sher   YOB: 1960   Date of Visit: 10/20/2022     Lars Mariscal MD  [address] [facility]  [address] [place] [address]    Dear Lars Mariscal MD,      Thank you for referring Mr. Cheyenne Sher to [address] [place] [address] for evaluation. My notes for this consultation are attached. If you have questions, please do not hesitate to call me. I look forward to following your patient along with you.       Sincerely,    Alie Mathew, DO
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
## 2023-02-03 DIAGNOSIS — E78.5 HYPERLIPIDEMIA, UNSPECIFIED HYPERLIPIDEMIA TYPE: Primary | ICD-10-CM

## 2023-02-05 DIAGNOSIS — R73.01 IFG (IMPAIRED FASTING GLUCOSE): Primary | ICD-10-CM

## 2023-02-24 LAB
ALBUMIN SERPL-MCNC: 4.8 G/DL (ref 3.8–4.9)
ALBUMIN SERPL-MCNC: 4.8 G/DL (ref 3.8–4.9)
ALBUMIN/GLOB SERPL: 1.7 {RATIO} (ref 1.2–2.2)
ALBUMIN/GLOB SERPL: 1.7 {RATIO} (ref 1.2–2.2)
ALP SERPL-CCNC: 54 IU/L (ref 44–121)
ALP SERPL-CCNC: 54 IU/L (ref 44–121)
ALT SERPL-CCNC: 58 IU/L (ref 0–44)
ALT SERPL-CCNC: 58 IU/L (ref 0–44)
AST SERPL-CCNC: 38 IU/L (ref 0–40)
AST SERPL-CCNC: 38 IU/L (ref 0–40)
BILIRUB SERPL-MCNC: 0.5 MG/DL (ref 0–1.2)
BILIRUB SERPL-MCNC: 0.5 MG/DL (ref 0–1.2)
BUN SERPL-MCNC: 13 MG/DL (ref 8–27)
BUN SERPL-MCNC: 13 MG/DL (ref 8–27)
BUN/CREAT SERPL: 14 (ref 10–24)
BUN/CREAT SERPL: 14 (ref 10–24)
CALCIUM SERPL-MCNC: 9.7 MG/DL (ref 8.6–10.2)
CALCIUM SERPL-MCNC: 9.7 MG/DL (ref 8.6–10.2)
CHLORIDE SERPL-SCNC: 100 MMOL/L (ref 96–106)
CHLORIDE SERPL-SCNC: 100 MMOL/L (ref 96–106)
CHOLEST SERPL-MCNC: 207 MG/DL (ref 100–199)
CHOLEST SERPL-MCNC: 207 MG/DL (ref 100–199)
CO2 SERPL-SCNC: 23 MMOL/L (ref 20–29)
CO2 SERPL-SCNC: 23 MMOL/L (ref 20–29)
CREAT SERPL-MCNC: 0.95 MG/DL (ref 0.76–1.27)
CREAT SERPL-MCNC: 0.95 MG/DL (ref 0.76–1.27)
EGFRCR SERPLBLD CKD-EPI 2021: 92 ML/MIN/1.73
EGFRCR SERPLBLD CKD-EPI 2021: 92 ML/MIN/1.73
GLOBULIN SER CALC-MCNC: 2.9 G/DL (ref 1.5–4.5)
GLOBULIN SER CALC-MCNC: 2.9 G/DL (ref 1.5–4.5)
GLUCOSE SERPL-MCNC: 99 MG/DL (ref 70–99)
GLUCOSE SERPL-MCNC: 99 MG/DL (ref 70–99)
HDLC SERPL-MCNC: 45 MG/DL
HDLC SERPL-MCNC: 45 MG/DL
IMP & REVIEW OF LAB RESULTS: NORMAL
IMP & REVIEW OF LAB RESULTS: NORMAL
LDLC SERPL CALC-MCNC: 142 MG/DL (ref 0–99)
LDLC SERPL CALC-MCNC: 142 MG/DL (ref 0–99)
POTASSIUM SERPL-SCNC: 4 MMOL/L (ref 3.5–5.2)
POTASSIUM SERPL-SCNC: 4 MMOL/L (ref 3.5–5.2)
PROT SERPL-MCNC: 7.7 G/DL (ref 6–8.5)
PROT SERPL-MCNC: 7.7 G/DL (ref 6–8.5)
SODIUM SERPL-SCNC: 139 MMOL/L (ref 134–144)
SODIUM SERPL-SCNC: 139 MMOL/L (ref 134–144)
SPECIMEN STATUS REPORT, ROLRST: NORMAL
SPECIMEN STATUS REPORT: NORMAL
TRIGL SERPL-MCNC: 109 MG/DL (ref 0–149)
TRIGL SERPL-MCNC: 109 MG/DL (ref 0–149)
VLDLC SERPL CALC-MCNC: 20 MG/DL (ref 5–40)
VLDLC SERPL CALC-MCNC: 20 MG/DL (ref 5–40)

## 2023-03-22 ENCOUNTER — OFFICE VISIT (OUTPATIENT)
Age: 63
End: 2023-03-22
Payer: COMMERCIAL

## 2023-03-22 VITALS
WEIGHT: 235 LBS | HEART RATE: 80 BPM | BODY MASS INDEX: 37.77 KG/M2 | SYSTOLIC BLOOD PRESSURE: 134 MMHG | OXYGEN SATURATION: 98 % | HEIGHT: 66 IN | DIASTOLIC BLOOD PRESSURE: 80 MMHG | TEMPERATURE: 97.8 F | RESPIRATION RATE: 18 BRPM

## 2023-03-22 DIAGNOSIS — Z00.00 PHYSICAL EXAM: Primary | ICD-10-CM

## 2023-03-22 DIAGNOSIS — Z99.89 OSA ON CPAP: ICD-10-CM

## 2023-03-22 DIAGNOSIS — G47.33 OSA ON CPAP: ICD-10-CM

## 2023-03-22 DIAGNOSIS — E78.5 HYPERLIPIDEMIA, UNSPECIFIED HYPERLIPIDEMIA TYPE: ICD-10-CM

## 2023-03-22 DIAGNOSIS — E66.01 SEVERE OBESITY (BMI 35.0-39.9) WITH COMORBIDITY (HCC): ICD-10-CM

## 2023-03-22 DIAGNOSIS — I10 PRIMARY HYPERTENSION: ICD-10-CM

## 2023-03-22 DIAGNOSIS — R73.01 IFG (IMPAIRED FASTING GLUCOSE): ICD-10-CM

## 2023-03-22 PROCEDURE — 99396 PREV VISIT EST AGE 40-64: CPT | Performed by: INTERNAL MEDICINE

## 2023-03-22 PROCEDURE — 3079F DIAST BP 80-89 MM HG: CPT | Performed by: INTERNAL MEDICINE

## 2023-03-22 PROCEDURE — 3075F SYST BP GE 130 - 139MM HG: CPT | Performed by: INTERNAL MEDICINE

## 2023-03-22 SDOH — ECONOMIC STABILITY: FOOD INSECURITY: WITHIN THE PAST 12 MONTHS, THE FOOD YOU BOUGHT JUST DIDN'T LAST AND YOU DIDN'T HAVE MONEY TO GET MORE.: NEVER TRUE

## 2023-03-22 SDOH — ECONOMIC STABILITY: INCOME INSECURITY: HOW HARD IS IT FOR YOU TO PAY FOR THE VERY BASICS LIKE FOOD, HOUSING, MEDICAL CARE, AND HEATING?: NOT HARD AT ALL

## 2023-03-22 SDOH — ECONOMIC STABILITY: HOUSING INSECURITY
IN THE LAST 12 MONTHS, WAS THERE A TIME WHEN YOU DID NOT HAVE A STEADY PLACE TO SLEEP OR SLEPT IN A SHELTER (INCLUDING NOW)?: NO

## 2023-03-22 SDOH — ECONOMIC STABILITY: FOOD INSECURITY: WITHIN THE PAST 12 MONTHS, YOU WORRIED THAT YOUR FOOD WOULD RUN OUT BEFORE YOU GOT MONEY TO BUY MORE.: NEVER TRUE

## 2023-03-22 ASSESSMENT — PATIENT HEALTH QUESTIONNAIRE - PHQ9
SUM OF ALL RESPONSES TO PHQ QUESTIONS 1-9: 0
1. LITTLE INTEREST OR PLEASURE IN DOING THINGS: 0
SUM OF ALL RESPONSES TO PHQ QUESTIONS 1-9: 0
SUM OF ALL RESPONSES TO PHQ9 QUESTIONS 1 & 2: 0
SUM OF ALL RESPONSES TO PHQ QUESTIONS 1-9: 0
SUM OF ALL RESPONSES TO PHQ QUESTIONS 1-9: 0
2. FEELING DOWN, DEPRESSED OR HOPELESS: 0

## 2023-03-22 NOTE — PROGRESS NOTES
Jem Marrero presents today for   Chief Complaint   Patient presents with    Follow-up    Hypertension    Obesity    Blood Sugar Problem    Hyperlipidemia       1. \"Have you been to the ER, urgent care clinic since your last visit? Hospitalized since your last visit? \" no    2. \"Have you seen or consulted any other health care providers outside of the [address] [place] [address] since your last visit? \" no     3. For patients aged 39-70: Has the patient had a colonoscopy / FIT/ Cologuard? Yes - no Care Gap present      If the patient is female:    4. For patients aged 41-77: Has the patient had a mammogram within the past 2 years? NA - based on age or sex      11. For patients aged 21-65: Has the patient had a pap smear?  NA - based on age or sex
frequency    Vitals:    03/22/23 1405   BP: 134/80   Pulse: 80   Resp: 18   Temp: 97.8 °F (36.6 °C)   SpO2: 98%     A&O x3  Affect is appropriate. Mood stable  No apparent distress  Anicteric, no JVD, adenopathy or thyromegaly. No carotid bruits or radiated murmur  Lungs clear to auscultation, no wheezes or rales  Heart showed regular rate and rhythm. No murmur, rubs, gallops  Abdomen soft nontender, no hepatosplenomegaly or masses. Extremities with trace edema.   Pulses 1-2+ symmetrically    LABS  From 9/11 showed   gluc 94,   cr 0.93, gfr 111, alt 86,            chol 194, tg 135, hdl 47, ldl-c 121, wbc 5.3, hb 12.9, plt 270, ua neg,    psa 0.40, ast 43  From 5/13 showed   gluc 102, cr 1.02, gfr 98,   alt 39, hba1c 5.7, chol 167, tg 129, hdl 42, ldl-c 99,   wbc 5.4, hb 13.4, plt 253,                  psa 0.50, ast 31   From 5/14 showed          hba1c 6.2, chol 174, tg 203, hdl 42, ldl-c 91  From 12/14 showed gluc 103, cr 0.90, gfr>60,      hba1c 5.8, chol 179, tg 66,   hdl 42, ldl-c 124, wbc 5.6, hb 12.7, plt 250,      psa 0.70  From 6/15 showed   gluc 100, cr 0.97, gfr>60,  alt 26, hba1c 6.1, chol 180, tg 133, hdl 46, ldl-c 107  From 12/15 showed          hba1c 5.7, chol 162, tg 92,   hdl 45, ldl-c 99,   wbc 4.9, hb 13.9, plt 281, ua neg,    psa 0.50, tsh 1.89, hep c-  From 6/16 showed          hba1c 6.0,            wbc 5.3, hb 13.5, plt 264  From 12/16 showed gluc 102, cr 1.01, gfr>60,      hba1c 5.7, chol 180, tg 151, hdl 53, ldl-c 97  From 6/17 showed   gluc 87,   cr 0.94, gfr>60, alt 55,  hba1c 5.7, chol 180, tg 143, hdl 48, ldl-c 103  From 12/18 showed          hba1c 5.8,            wbc 4.7, hb 13.6, plt 249,                  psa 0.60  From 6/18 showed          hba1c 5.9, chol 165, tg 130, hdl 45, ldl-c 94,   wbc 5.2, hb 12.8, plt 235  From 12/18 showed gluc 92,   cr 1.05, gfr>60,      hba1c 5.7,                fe 103, %sat 28, ferritin 436, b12 728, fol>20, spep neg  From 6/19 showed         hba1c 6.4,

## 2023-05-03 RX ORDER — TRIAMTERENE AND HYDROCHLOROTHIAZIDE 37.5; 25 MG/1; MG/1
CAPSULE ORAL
Qty: 90 CAPSULE | Refills: 3 | Status: SHIPPED | OUTPATIENT
Start: 2023-05-03

## 2023-05-23 DIAGNOSIS — R73.01 IFG (IMPAIRED FASTING GLUCOSE): ICD-10-CM

## 2023-05-23 DIAGNOSIS — E78.5 HYPERLIPIDEMIA, UNSPECIFIED HYPERLIPIDEMIA TYPE: ICD-10-CM

## 2023-05-23 PROCEDURE — 36415 COLL VENOUS BLD VENIPUNCTURE: CPT | Performed by: INTERNAL MEDICINE

## 2023-09-20 ENCOUNTER — HOSPITAL ENCOUNTER (OUTPATIENT)
Facility: HOSPITAL | Age: 63
Setting detail: SPECIMEN
Discharge: HOME | End: 2023-09-23
Payer: COMMERCIAL

## 2023-09-20 DIAGNOSIS — R73.01 IFG (IMPAIRED FASTING GLUCOSE): ICD-10-CM

## 2023-09-20 DIAGNOSIS — Z00.00 PHYSICAL EXAM: ICD-10-CM

## 2023-09-20 DIAGNOSIS — E78.5 HYPERLIPIDEMIA, UNSPECIFIED HYPERLIPIDEMIA TYPE: ICD-10-CM

## 2023-09-20 LAB
BASOPHILS # BLD: 0.1 K/UL (ref 0–0.1)
BASOPHILS NFR BLD: 1 % (ref 0–2)
CHOLEST SERPL-MCNC: 191 MG/DL
DIFFERENTIAL METHOD BLD: ABNORMAL
EOSINOPHIL # BLD: 0.4 K/UL (ref 0–0.4)
EOSINOPHIL NFR BLD: 8 % (ref 0–5)
ERYTHROCYTE [DISTWIDTH] IN BLOOD BY AUTOMATED COUNT: 11.9 % (ref 11.6–14.5)
HBA1C MFR BLD: 6.6 % (ref 4.2–5.6)
HCT VFR BLD AUTO: 42.9 % (ref 36–48)
HDLC SERPL-MCNC: 45 MG/DL (ref 40–60)
HDLC SERPL: 4.2 (ref 0–5)
HGB BLD-MCNC: 13 G/DL (ref 13–16)
IMM GRANULOCYTES # BLD AUTO: 0 K/UL (ref 0–0.04)
IMM GRANULOCYTES NFR BLD AUTO: 0 % (ref 0–0.5)
LDLC SERPL CALC-MCNC: 122.4 MG/DL (ref 0–100)
LIPID PANEL: ABNORMAL
LYMPHOCYTES # BLD: 2.2 K/UL (ref 0.9–3.6)
LYMPHOCYTES NFR BLD: 37 % (ref 21–52)
MCH RBC QN AUTO: 27.7 PG (ref 24–34)
MCHC RBC AUTO-ENTMCNC: 30.3 G/DL (ref 31–37)
MCV RBC AUTO: 91.3 FL (ref 78–100)
MONOCYTES # BLD: 0.6 K/UL (ref 0.05–1.2)
MONOCYTES NFR BLD: 10 % (ref 3–10)
NEUTS SEG # BLD: 2.6 K/UL (ref 1.8–8)
NEUTS SEG NFR BLD: 45 % (ref 40–73)
NRBC # BLD: 0 K/UL (ref 0–0.01)
NRBC BLD-RTO: 0 PER 100 WBC
PLATELET # BLD AUTO: 231 K/UL (ref 135–420)
PMV BLD AUTO: 11.6 FL (ref 9.2–11.8)
PSA SERPL-MCNC: 0.5 NG/ML (ref 0–4)
RBC # BLD AUTO: 4.7 M/UL (ref 4.35–5.65)
TRIGL SERPL-MCNC: 118 MG/DL
VLDLC SERPL CALC-MCNC: 23.6 MG/DL
WBC # BLD AUTO: 5.9 K/UL (ref 4.6–13.2)

## 2023-09-20 PROCEDURE — 85025 COMPLETE CBC W/AUTO DIFF WBC: CPT

## 2023-09-20 PROCEDURE — 80061 LIPID PANEL: CPT

## 2023-09-20 PROCEDURE — 36415 COLL VENOUS BLD VENIPUNCTURE: CPT

## 2023-09-20 PROCEDURE — 83036 HEMOGLOBIN GLYCOSYLATED A1C: CPT

## 2023-09-20 PROCEDURE — G0103 PSA SCREENING: HCPCS
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
## 2023-09-25 ENCOUNTER — TELEPHONE (OUTPATIENT)
Age: 63
End: 2023-09-25

## 2023-09-25 NOTE — TELEPHONE ENCOUNTER
----- Message from Ramon Avila sent at 9/25/2023 10:43 AM EDT -----  Subject: Message to Provider    QUESTIONS  Information for Provider? Patient returned call to confirm appointment on   09/27/23. Will arrived 15 minutes early.    ---------------------------------------------------------------------------  --------------  Celsa RAMIREZ  [phone]; OK to leave message on voicemail  ---------------------------------------------------------------------------  --------------  SCRIPT ANSWERS  undefined

## 2023-09-27 ENCOUNTER — OFFICE VISIT (OUTPATIENT)
Age: 63
End: 2023-09-27
Payer: COMMERCIAL

## 2023-09-27 VITALS
DIASTOLIC BLOOD PRESSURE: 89 MMHG | TEMPERATURE: 97.4 F | SYSTOLIC BLOOD PRESSURE: 144 MMHG | HEIGHT: 66 IN | HEART RATE: 75 BPM | BODY MASS INDEX: 38.57 KG/M2 | OXYGEN SATURATION: 94 % | RESPIRATION RATE: 16 BRPM | WEIGHT: 240 LBS

## 2023-09-27 DIAGNOSIS — I10 PRIMARY HYPERTENSION: Primary | ICD-10-CM

## 2023-09-27 DIAGNOSIS — G47.33 OSA ON CPAP: ICD-10-CM

## 2023-09-27 DIAGNOSIS — E78.5 HYPERLIPIDEMIA, UNSPECIFIED HYPERLIPIDEMIA TYPE: ICD-10-CM

## 2023-09-27 DIAGNOSIS — R73.01 IFG (IMPAIRED FASTING GLUCOSE): ICD-10-CM

## 2023-09-27 DIAGNOSIS — E66.01 MORBID OBESITY DUE TO EXCESS CALORIES (HCC): ICD-10-CM

## 2023-09-27 PROCEDURE — 3079F DIAST BP 80-89 MM HG: CPT | Performed by: INTERNAL MEDICINE

## 2023-09-27 PROCEDURE — 99214 OFFICE O/P EST MOD 30 MIN: CPT | Performed by: INTERNAL MEDICINE

## 2023-09-27 PROCEDURE — 3077F SYST BP >= 140 MM HG: CPT | Performed by: INTERNAL MEDICINE

## 2023-09-27 SDOH — ECONOMIC STABILITY: FOOD INSECURITY: WITHIN THE PAST 12 MONTHS, YOU WORRIED THAT YOUR FOOD WOULD RUN OUT BEFORE YOU GOT MONEY TO BUY MORE.: NEVER TRUE

## 2023-09-27 SDOH — ECONOMIC STABILITY: FOOD INSECURITY: WITHIN THE PAST 12 MONTHS, THE FOOD YOU BOUGHT JUST DIDN'T LAST AND YOU DIDN'T HAVE MONEY TO GET MORE.: NEVER TRUE

## 2023-09-27 SDOH — ECONOMIC STABILITY: INCOME INSECURITY: HOW HARD IS IT FOR YOU TO PAY FOR THE VERY BASICS LIKE FOOD, HOUSING, MEDICAL CARE, AND HEATING?: NOT HARD AT ALL

## 2023-09-27 ASSESSMENT — PATIENT HEALTH QUESTIONNAIRE - PHQ9
2. FEELING DOWN, DEPRESSED OR HOPELESS: 0
SUM OF ALL RESPONSES TO PHQ QUESTIONS 1-9: 0
SUM OF ALL RESPONSES TO PHQ9 QUESTIONS 1 & 2: 0
1. LITTLE INTEREST OR PLEASURE IN DOING THINGS: 0
SUM OF ALL RESPONSES TO PHQ QUESTIONS 1-9: 0

## 2023-09-27 NOTE — PROGRESS NOTES
Chief Complaint   Patient presents with    Follow-up         Juvencionicole Vimal presents today for   Chief Complaint   Patient presents with    Follow-up           1. \"Have you been to the ER, urgent care clinic since your last visit? Hospitalized since your last visit? \" no    2. \"Have you seen or consulted any other health care providers outside of the [address] [place] [address] since your last visit? \" no     3. For patients aged 43-73: Has the patient had a colonoscopy / FIT/ Cologuard? Yes - no Care Gap present      If the patient is female:    4. For patients aged 43-66: Has the patient had a mammogram within the past 2 years? NA - based on age or sex      11. For patients aged 21-65: Has the patient had a pap smear?  NA - based on age or sex

## 2024-03-21 ENCOUNTER — HOSPITAL ENCOUNTER (OUTPATIENT)
Facility: HOSPITAL | Age: 64
Setting detail: SPECIMEN
Discharge: HOME | End: 2024-03-21
Payer: COMMERCIAL

## 2024-03-21 DIAGNOSIS — R73.01 IFG (IMPAIRED FASTING GLUCOSE): ICD-10-CM

## 2024-03-21 LAB
ANION GAP SERPL CALC-SCNC: 2 MMOL/L (ref 3–18)
BUN SERPL-MCNC: 13 MG/DL (ref 7–18)
BUN/CREAT SERPL: 12 (ref 12–20)
CALCIUM SERPL-MCNC: 9.9 MG/DL (ref 8.5–10.1)
CHLORIDE SERPL-SCNC: 106 MMOL/L (ref 100–111)
CHOLEST SERPL-MCNC: 192 MG/DL
CO2 SERPL-SCNC: 32 MMOL/L (ref 21–32)
CREAT SERPL-MCNC: 1.06 MG/DL (ref 0.6–1.3)
GLUCOSE SERPL-MCNC: 129 MG/DL (ref 74–99)
HBA1C MFR BLD: 6.5 % (ref 4.2–5.6)
HDLC SERPL-MCNC: 48 MG/DL (ref 40–60)
HDLC SERPL: 4 (ref 0–5)
LDLC SERPL CALC-MCNC: 118.6 MG/DL (ref 0–100)
LIPID PANEL: ABNORMAL
POTASSIUM SERPL-SCNC: 4.6 MMOL/L (ref 3.5–5.5)
SODIUM SERPL-SCNC: 140 MMOL/L (ref 136–145)
TRIGL SERPL-MCNC: 127 MG/DL
VLDLC SERPL CALC-MCNC: 25.4 MG/DL

## 2024-03-21 PROCEDURE — 83036 HEMOGLOBIN GLYCOSYLATED A1C: CPT

## 2024-03-21 PROCEDURE — 80061 LIPID PANEL: CPT

## 2024-03-21 PROCEDURE — 36415 COLL VENOUS BLD VENIPUNCTURE: CPT

## 2024-03-21 PROCEDURE — 80048 BASIC METABOLIC PNL TOTAL CA: CPT

## 2024-03-27 ENCOUNTER — OFFICE VISIT (OUTPATIENT)
Age: 64
End: 2024-03-27
Payer: COMMERCIAL

## 2024-03-27 VITALS
BODY MASS INDEX: 37.28 KG/M2 | DIASTOLIC BLOOD PRESSURE: 87 MMHG | OXYGEN SATURATION: 98 % | SYSTOLIC BLOOD PRESSURE: 139 MMHG | TEMPERATURE: 98.3 F | RESPIRATION RATE: 16 BRPM | HEIGHT: 66 IN | HEART RATE: 74 BPM | WEIGHT: 232 LBS

## 2024-03-27 DIAGNOSIS — I10 PRIMARY HYPERTENSION: ICD-10-CM

## 2024-03-27 DIAGNOSIS — E11.9 TYPE 2 DIABETES MELLITUS WITHOUT COMPLICATION, WITHOUT LONG-TERM CURRENT USE OF INSULIN (HCC): Primary | ICD-10-CM

## 2024-03-27 DIAGNOSIS — E78.5 HYPERLIPIDEMIA, UNSPECIFIED HYPERLIPIDEMIA TYPE: ICD-10-CM

## 2024-03-27 DIAGNOSIS — Z12.11 ENCOUNTER FOR SCREENING COLONOSCOPY: ICD-10-CM

## 2024-03-27 DIAGNOSIS — E66.01 SEVERE OBESITY (BMI 35.0-35.9 WITH COMORBIDITY) (HCC): ICD-10-CM

## 2024-03-27 PROCEDURE — 3079F DIAST BP 80-89 MM HG: CPT | Performed by: INTERNAL MEDICINE

## 2024-03-27 PROCEDURE — 3044F HG A1C LEVEL LT 7.0%: CPT | Performed by: INTERNAL MEDICINE

## 2024-03-27 PROCEDURE — 3075F SYST BP GE 130 - 139MM HG: CPT | Performed by: INTERNAL MEDICINE

## 2024-03-27 PROCEDURE — 99214 OFFICE O/P EST MOD 30 MIN: CPT | Performed by: INTERNAL MEDICINE

## 2024-03-27 RX ORDER — LANCETS 30 GAUGE
1 EACH MISCELLANEOUS DAILY
Qty: 100 EACH | Refills: 5 | Status: SHIPPED | OUTPATIENT
Start: 2024-03-27

## 2024-03-27 RX ORDER — GLUCOSAMINE HCL/CHONDROITIN SU 500-400 MG
CAPSULE ORAL
Qty: 100 STRIP | Refills: 5 | Status: SHIPPED | OUTPATIENT
Start: 2024-03-27

## 2024-03-27 ASSESSMENT — PATIENT HEALTH QUESTIONNAIRE - PHQ9
SUM OF ALL RESPONSES TO PHQ QUESTIONS 1-9: 0
1. LITTLE INTEREST OR PLEASURE IN DOING THINGS: NOT AT ALL
SUM OF ALL RESPONSES TO PHQ QUESTIONS 1-9: 0
SUM OF ALL RESPONSES TO PHQ QUESTIONS 1-9: 0
SUM OF ALL RESPONSES TO PHQ9 QUESTIONS 1 & 2: 0
SUM OF ALL RESPONSES TO PHQ QUESTIONS 1-9: 0
2. FEELING DOWN, DEPRESSED OR HOPELESS: NOT AT ALL

## 2024-03-27 NOTE — PROGRESS NOTES
Ramiro Munoz presents today for   Chief Complaint   Patient presents with    6 Month Follow-Up    Hypertension       \"Have you been to the ER, urgent care clinic since your last visit?  Hospitalized since your last visit?\"    NO    “Have you seen or consulted any other health care providers outside of [facility] since your last visit?”    NO             
capsule by mouth once daily    Cetirizine HCl 10 MG CAPS Take 10 mg by mouth as needed     No current facility-administered medications for this visit.     No Known Allergies    REVIEW OF SYSTEMS: colo 9/19 Dr Blas    Vitals:    03/27/24 1417   BP: 139/87   Site: Left Upper Arm   Position: Sitting   Cuff Size: Large Adult   Pulse: 74   Resp: 16   Temp: 98.3 °F (36.8 °C)   TempSrc: Temporal   SpO2: 98%   Weight: 105.2 kg (232 lb)   Height: 1.676 m (5' 6\")   A&O x3  Affect is appropriate.  Mood stable  No apparent distress  Anicteric, no JVD, adenopathy or thyromegaly.   No carotid bruits or radiated murmur  Lungs clear to auscultation, no wheezes or rales  Heart showed regular rate and rhythm. No murmur, rubs, gallops  Abdomen soft nontender, no hepatosplenomegaly or masses.   Extremities with trace edema.  Pulses 1-2+ symmetrically    LABS  From 9/11 showed   gluc 94,   cr 0.93, gfr 111, alt 86,            chol 194, tg 135, hdl 47, ldl-c 121, wbc 5.3, hb 12.9, plt 270, ua neg,    psa 0.40, ast 43  From 5/13 showed   gluc 102, cr 1.02, gfr 98,   alt 39, hba1c 5.7, chol 167, tg 129, hdl 42, ldl-c 99,   wbc 5.4, hb 13.4, plt 253,                  psa 0.50, ast 31   From 5/14 showed          hba1c 6.2, chol 174, tg 203, hdl 42, ldl-c 91  From 12/14 showed gluc 103, cr 0.90, gfr>60,      hba1c 5.8, chol 179, tg 66,   hdl 42, ldl-c 124, wbc 5.6, hb 12.7, plt 250,      psa 0.70  From 6/15 showed   gluc 100, cr 0.97, gfr>60,  alt 26, hba1c 6.1, chol 180, tg 133, hdl 46, ldl-c 107  From 12/15 showed          hba1c 5.7, chol 162, tg 92,   hdl 45, ldl-c 99,   wbc 4.9, hb 13.9, plt 281, ua neg,    psa 0.50, tsh 1.89, hep c-  From 6/16 showed          hba1c 6.0,            wbc 5.3, hb 13.5, plt 264  From 12/16 showed gluc 102, cr 1.01, gfr>60,      hba1c 5.7, chol 180, tg 151, hdl 53, ldl-c 97  From 6/17 showed   gluc 87,   cr 0.94, gfr>60, alt 55,  hba1c 5.7, chol 180, tg 143, hdl 48, ldl-c 103  From 12/18 showed

## 2024-05-10 ENCOUNTER — TELEMEDICINE (OUTPATIENT)
Age: 64
End: 2024-05-10
Payer: COMMERCIAL

## 2024-05-10 DIAGNOSIS — J06.9 URTI (ACUTE UPPER RESPIRATORY INFECTION): Primary | ICD-10-CM

## 2024-05-10 PROCEDURE — 99213 OFFICE O/P EST LOW 20 MIN: CPT | Performed by: INTERNAL MEDICINE

## 2024-05-10 RX ORDER — PREDNISONE 10 MG/1
10 TABLET ORAL 2 TIMES DAILY
Qty: 10 TABLET | Refills: 0 | Status: SHIPPED | OUTPATIENT
Start: 2024-05-10 | End: 2024-05-15

## 2024-05-10 RX ORDER — BENZONATATE 100 MG/1
100 CAPSULE ORAL 3 TIMES DAILY PRN
Qty: 30 CAPSULE | Refills: 0 | Status: SHIPPED | OUTPATIENT
Start: 2024-05-10 | End: 2024-05-20

## 2024-05-10 RX ORDER — AZITHROMYCIN 250 MG/1
TABLET, FILM COATED ORAL
Qty: 6 TABLET | Refills: 0 | Status: SHIPPED | OUTPATIENT
Start: 2024-05-10 | End: 2024-05-15

## 2024-05-10 NOTE — PROGRESS NOTES
Ramiro Munoz, was evaluated through a synchronous (real-time) audio-video encounter. The patient (or guardian if applicable) is aware that this is a billable service, which includes applicable co-pays. This Virtual Visit was conducted with patient's (and/or legal guardian's) consent. Patient identification was verified, and a caregiver was present when appropriate.   The patient was located at Home: [address] [place] [id number]  Provider was located at Facility (Appt Dept): [address] [place] [address], [address]  [place] [address]  Confirm you are appropriately licensed, registered, or certified to deliver care in the state where the patient is located as indicated above. If you are not or unsure, please re-schedule the visit: Yes, I confirm.     Ramiro Munoz (:  1960) is a Established patient, presenting virtually for evaluation of the following:    Assessment & Plan   Below is the assessment and plan developed based on review of pertinent history, physical exam, labs, studies, and medications.  1. URTI (acute upper respiratory infection)  Assessment & Plan:   Patient to seek help if does not start to feel better in next couple of days.  Orders:  -     azithromycin (ZITHROMAX) 250 MG tablet; 500mg on day 1 followed by 250mg on days 2 - 5, Disp-6 tablet, R-0Normal  -     predniSONE (DELTASONE) 10 MG tablet; Take 1 tablet by mouth 2 times daily for 5 days, Disp-10 tablet, R-0Normal  -     benzonatate (TESSALON) 100 MG capsule; Take 1 capsule by mouth 3 times daily as needed for Cough, Disp-30 capsule, R-0Normal    No follow-ups on file.       Subjective   HPI  Patient follow-up for last 10 days.  For last 2 to 3 days this is associated with yellowish sputum  no wheezing/chest congestion.  Patient has used Hernandez's cough drops with minimal help.  No fever or chills.  No GI symptoms.    No nasal congestion.  No sinus discomfort.  No fullness in ears.    ROS as above         Objective

## 2024-05-10 NOTE — PROGRESS NOTES
\"Have you been to the ER, urgent care clinic since your last visit?  Hospitalized since your last visit?\"    NO    “Have you seen or consulted any other health care providers outside of [facility] since your last visit?”    NO

## 2024-05-13 PROBLEM — J06.9 URTI (ACUTE UPPER RESPIRATORY INFECTION): Status: ACTIVE | Noted: 2024-05-13

## 2024-05-15 ENCOUNTER — OFFICE VISIT (OUTPATIENT)
Age: 64
End: 2024-05-15
Payer: COMMERCIAL

## 2024-05-15 VITALS
SYSTOLIC BLOOD PRESSURE: 133 MMHG | DIASTOLIC BLOOD PRESSURE: 87 MMHG | HEART RATE: 65 BPM | TEMPERATURE: 98.7 F | OXYGEN SATURATION: 97 % | RESPIRATION RATE: 16 BRPM | HEIGHT: 66 IN | WEIGHT: 225 LBS | BODY MASS INDEX: 36.16 KG/M2

## 2024-05-15 DIAGNOSIS — E66.01 SEVERE OBESITY (BMI 35.0-35.9 WITH COMORBIDITY) (HCC): ICD-10-CM

## 2024-05-15 DIAGNOSIS — R68.89 THROAT CONGESTION: Primary | ICD-10-CM

## 2024-05-15 DIAGNOSIS — J06.9 UPPER RESPIRATORY TRACT INFECTION, UNSPECIFIED TYPE: ICD-10-CM

## 2024-05-15 DIAGNOSIS — E11.9 TYPE 2 DIABETES MELLITUS WITHOUT COMPLICATION, WITHOUT LONG-TERM CURRENT USE OF INSULIN (HCC): ICD-10-CM

## 2024-05-15 PROCEDURE — 3079F DIAST BP 80-89 MM HG: CPT | Performed by: INTERNAL MEDICINE

## 2024-05-15 PROCEDURE — 3044F HG A1C LEVEL LT 7.0%: CPT | Performed by: INTERNAL MEDICINE

## 2024-05-15 PROCEDURE — 99214 OFFICE O/P EST MOD 30 MIN: CPT | Performed by: INTERNAL MEDICINE

## 2024-05-15 PROCEDURE — 3075F SYST BP GE 130 - 139MM HG: CPT | Performed by: INTERNAL MEDICINE

## 2024-05-15 NOTE — PROGRESS NOTES
Ramiro Munoz presents today for   Chief Complaint   Patient presents with    Follow-up    Hypertension    Diabetes     Virtual visit with Dr. Nielsen on 05/10/24    \"Have you been to the ER, urgent care clinic since your last visit?  Hospitalized since your last visit?\"    NO    “Have you seen or consulted any other health care providers outside of [facility] since your last visit?”    NO
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
## 2024-05-22 RX ORDER — TRIAMTERENE AND HYDROCHLOROTHIAZIDE 37.5; 25 MG/1; MG/1
CAPSULE ORAL
Qty: 90 CAPSULE | Refills: 3 | Status: SHIPPED | OUTPATIENT
Start: 2024-05-22

## 2024-07-29 ENCOUNTER — HOSPITAL ENCOUNTER (OUTPATIENT)
Facility: HOSPITAL | Age: 64
Setting detail: SPECIMEN
Discharge: HOME | End: 2024-08-01
Payer: COMMERCIAL

## 2024-07-29 DIAGNOSIS — E11.9 TYPE 2 DIABETES MELLITUS WITHOUT COMPLICATION, WITHOUT LONG-TERM CURRENT USE OF INSULIN (HCC): ICD-10-CM

## 2024-07-29 LAB
ANION GAP SERPL CALC-SCNC: 8 MMOL/L (ref 3–18)
BUN SERPL-MCNC: 10 MG/DL (ref 7–18)
BUN/CREAT SERPL: 10 (ref 12–20)
CALCIUM SERPL-MCNC: 9.5 MG/DL (ref 8.5–10.1)
CHLORIDE SERPL-SCNC: 105 MMOL/L (ref 100–111)
CHOLEST SERPL-MCNC: 175 MG/DL
CO2 SERPL-SCNC: 26 MMOL/L (ref 21–32)
CREAT SERPL-MCNC: 0.99 MG/DL (ref 0.6–1.3)
CREAT UR-MCNC: 48 MG/DL (ref 30–125)
GLUCOSE SERPL-MCNC: 107 MG/DL (ref 74–99)
HBA1C MFR BLD: 5.9 % (ref 4.2–5.6)
HDLC SERPL-MCNC: 47 MG/DL (ref 40–60)
HDLC SERPL: 3.7 (ref 0–5)
LDLC SERPL CALC-MCNC: 111.4 MG/DL (ref 0–100)
LIPID PANEL: ABNORMAL
MICROALBUMIN UR-MCNC: 0.57 MG/DL (ref 0–3)
MICROALBUMIN/CREAT UR-RTO: 12 MG/G (ref 0–30)
POTASSIUM SERPL-SCNC: 4.3 MMOL/L (ref 3.5–5.5)
SODIUM SERPL-SCNC: 139 MMOL/L (ref 136–145)
TRIGL SERPL-MCNC: 83 MG/DL
VLDLC SERPL CALC-MCNC: 16.6 MG/DL

## 2024-07-29 PROCEDURE — 36415 COLL VENOUS BLD VENIPUNCTURE: CPT

## 2024-07-29 PROCEDURE — 80048 BASIC METABOLIC PNL TOTAL CA: CPT

## 2024-07-29 PROCEDURE — 83036 HEMOGLOBIN GLYCOSYLATED A1C: CPT

## 2024-07-29 PROCEDURE — 82570 ASSAY OF URINE CREATININE: CPT

## 2024-07-29 PROCEDURE — 82043 UR ALBUMIN QUANTITATIVE: CPT

## 2024-07-29 PROCEDURE — 80061 LIPID PANEL: CPT

## 2024-07-31 NOTE — PROGRESS NOTES
Pharmacy Progress Note - Diabetes Management       Assessment / Plan:   Diabetes Management:  Per ADA guidelines, Pt's A1c is at goal of < 7%.  He is now into the prediabetes range with diet and exercise alone.  Congratulated pt on his excellent glycemic control without the use of medications.  Encouraged to maintain his lifestyle changes.  Will reassess with SMBG logs at follow up in 3 months.    Hyperlipidemia:  The 10-year ASCVD risk score (Julianna KING, et al., 2019) is: 26.9% based on parameters listed. Current lipid treatment guidelines recommend at least moderate-intensity statin doses for all patients with diabetes to decrease overall ASCVD risk. Patient currently qualifies for a high intensity statin therapy based on current recommendations and is not currently on statin tx.    Provided education and rationale for the need for statin tx to include his dx of diabetes and elevated LDL value.  He agreed to start at this time.  He has a h/o variable LFTs likely 2/2 his SZYMANSKI.  His most recent levels were slightly elevated.  This will need to be monitored regularly.  - Will start Lipitor 40mg daily as it is the preferred tx with SZYMANSKI.     Nutrition/Lifestyle Modifications:  - Educated pt on the importance of moderating carbohydrate intake. Reviewed sources of carbohydrates and method to help determine appropriate portion sizes (e.g., Diabetes Plate Method).  - Advised patient to avoid sugar-sweetened beverages and replace with water or diet/zero sugar option.  - Recommend ~30 minutes consistent, moderately intensive, exercise/day or ~150 minutes/week. Start small, stay consistent, and increase length and types of exercise, as tolerated.       Patient will return to clinic in 3 month(s) for follow up.        S/O: Mr. Ramiro Munoz, a 64 y.o. male referred by Saul Franco MD,  has a past medical history of Agatston CAC score, <100, Allergic rhinitis, Degenerative arthritis of lumbar spine, Diastasis recti,

## 2024-08-02 ENCOUNTER — PHARMACY VISIT (OUTPATIENT)
Facility: CLINIC | Age: 64
End: 2024-08-02

## 2024-08-02 DIAGNOSIS — E11.9 TYPE 2 DIABETES MELLITUS WITHOUT COMPLICATION, WITHOUT LONG-TERM CURRENT USE OF INSULIN (HCC): Primary | ICD-10-CM

## 2024-08-02 RX ORDER — ATORVASTATIN CALCIUM 40 MG/1
40 TABLET, FILM COATED ORAL DAILY
Qty: 90 TABLET | Refills: 3 | Status: SHIPPED | OUTPATIENT
Start: 2024-08-02

## 2024-08-02 NOTE — PATIENT INSTRUCTIONS
- Start Lipitor (atorvastatin) 40mg every day    For any questions, please call [phone] or your PCP office.

## 2024-08-06 ENCOUNTER — OFFICE VISIT (OUTPATIENT)
Facility: CLINIC | Age: 64
End: 2024-08-06
Payer: COMMERCIAL

## 2024-08-06 VITALS
RESPIRATION RATE: 16 BRPM | BODY MASS INDEX: 36.16 KG/M2 | OXYGEN SATURATION: 97 % | TEMPERATURE: 98.4 F | HEIGHT: 66 IN | WEIGHT: 225 LBS | SYSTOLIC BLOOD PRESSURE: 153 MMHG | DIASTOLIC BLOOD PRESSURE: 86 MMHG | HEART RATE: 63 BPM

## 2024-08-06 DIAGNOSIS — E66.01 SEVERE OBESITY (BMI 35.0-35.9 WITH COMORBIDITY) (HCC): ICD-10-CM

## 2024-08-06 DIAGNOSIS — E11.9 TYPE 2 DIABETES MELLITUS WITHOUT COMPLICATION, WITHOUT LONG-TERM CURRENT USE OF INSULIN (HCC): Primary | ICD-10-CM

## 2024-08-06 DIAGNOSIS — E78.5 HYPERLIPIDEMIA, UNSPECIFIED HYPERLIPIDEMIA TYPE: ICD-10-CM

## 2024-08-06 DIAGNOSIS — K76.0 METABOLIC DYSFUNCTION-ASSOCIATED STEATOTIC LIVER DISEASE (MASLD): ICD-10-CM

## 2024-08-06 DIAGNOSIS — I10 PRIMARY HYPERTENSION: ICD-10-CM

## 2024-08-06 DIAGNOSIS — G47.33 OSA ON CPAP: ICD-10-CM

## 2024-08-06 PROBLEM — J06.9 URTI (ACUTE UPPER RESPIRATORY INFECTION): Status: RESOLVED | Noted: 2024-05-13 | Resolved: 2024-08-06

## 2024-08-06 PROCEDURE — 3044F HG A1C LEVEL LT 7.0%: CPT | Performed by: INTERNAL MEDICINE

## 2024-08-06 PROCEDURE — 3079F DIAST BP 80-89 MM HG: CPT | Performed by: INTERNAL MEDICINE

## 2024-08-06 PROCEDURE — 99214 OFFICE O/P EST MOD 30 MIN: CPT | Performed by: INTERNAL MEDICINE

## 2024-08-06 PROCEDURE — 3077F SYST BP >= 140 MM HG: CPT | Performed by: INTERNAL MEDICINE

## 2024-08-06 RX ORDER — LOSARTAN POTASSIUM AND HYDROCHLOROTHIAZIDE 12.5; 5 MG/1; MG/1
1 TABLET ORAL DAILY
Qty: 90 TABLET | Refills: 1 | Status: SHIPPED | OUTPATIENT
Start: 2024-08-06

## 2024-08-06 NOTE — PROGRESS NOTES
Ramiro Munoz presents today for   Chief Complaint   Patient presents with    4 Month Follow-Up    Hypertension    Diabetes       \"Have you been to the ER, urgent care clinic since your last visit?  Hospitalized since your last visit?\"    NO    “Have you seen or consulted any other health care providers outside of [facility] since your last visit?”    NO             
will think about it, follow up Dr Peters        RTC 12/24    Above conditions discussed at length and patient vocalized understanding.  All questions answered to patient satisfaction     Diagnosis Orders   1. Type 2 diabetes mellitus without complication, without long-term current use of insulin (HCC)  HEMOGLOBIN A1C W/O EAG      2. Severe obesity (BMI 35.0-35.9 with comorbidity) (HCC)        3. Primary hypertension  losartan-hydroCHLOROthiazide (HYZAAR) 50-12.5 MG per tablet    Comprehensive Metabolic Panel    CBC with Auto Differential      4. Hyperlipidemia, unspecified hyperlipidemia type  Lipid Panel      5. WANDA on CPAP        6. Metabolic dysfunction-associated steatotic liver disease (MASLD)

## 2024-10-30 NOTE — PROGRESS NOTES
is now into the prediabetes range with diet and exercise alone.  Congratulated pt on his excellent glycemic control without the use of medications.  Encouraged to maintain his lifestyle changes.  Will reassess with SMBG logs at follow up in 3 months.    Lipitor 40mg daily started.    Today:   He is tolerating the Lipitor well at this time.  He states he would like to come off of it, but agrees to continue to take it d/t his increased ASCVD risk.    He states that he has cut back on his physical activity.  He states that he has maintained his typical diet.  He wants to get back on his bike to increase his exercise after seeing his BG values elevated.    Discussed the benefits of adding Actos to his medication regimen to help with both T2DM and MAFLD.  He is amenable to starting it at this time.    Current anti-hyperglycemic regimen includes:    Key Antihyperglycemic Medications       Patient is on no antihyperglycemic meds.          Complete current medication regimen includes:  Current Outpatient Medications   Medication Sig    losartan-hydroCHLOROthiazide (HYZAAR) 50-12.5 MG per tablet Take 1 tablet by mouth daily    atorvastatin (LIPITOR) 40 MG tablet Take 1 tablet by mouth daily    blood glucose monitor kit and supplies Use once daily as directed    Lancets MISC 1 each by Does not apply route daily    blood glucose monitor strips Test 1 times a day & as needed for symptoms of irregular blood glucose. Dispense sufficient amount for indicated testing frequency plus additional to accommodate PRN testing needs.    Cetirizine HCl 10 MG CAPS Take 10 mg by mouth as needed     No current facility-administered medications for this visit.     Allergies:  No Known Allergies    Blood Glucose Monitoring (BGM) or CGM:  - Had access to home glucometer/blood glucose log/CGM reader today:  Yes  - Conducts/scans: 1x daily   - Fasting BG today: 103  - Post-prandial:   -FB,181,133,150,159,123,95,108,109,153,

## 2024-11-01 ENCOUNTER — PHARMACY VISIT (OUTPATIENT)
Facility: CLINIC | Age: 64
End: 2024-11-01

## 2024-11-01 DIAGNOSIS — E11.9 TYPE 2 DIABETES MELLITUS WITHOUT COMPLICATION, WITHOUT LONG-TERM CURRENT USE OF INSULIN (HCC): Primary | ICD-10-CM

## 2024-11-01 RX ORDER — PIOGLITAZONEHYDROCHLORIDE 15 MG/1
15 TABLET ORAL DAILY
Qty: 90 TABLET | Refills: 1 | Status: SHIPPED | OUTPATIENT
Start: 2024-11-01

## 2024-12-10 ENCOUNTER — HOSPITAL ENCOUNTER (OUTPATIENT)
Facility: HOSPITAL | Age: 64
Setting detail: SPECIMEN
Discharge: HOME | End: 2024-12-13
Payer: COMMERCIAL

## 2024-12-10 DIAGNOSIS — E11.9 TYPE 2 DIABETES MELLITUS WITHOUT COMPLICATION, WITHOUT LONG-TERM CURRENT USE OF INSULIN (HCC): ICD-10-CM

## 2024-12-10 DIAGNOSIS — I10 PRIMARY HYPERTENSION: ICD-10-CM

## 2024-12-10 DIAGNOSIS — E78.5 HYPERLIPIDEMIA, UNSPECIFIED HYPERLIPIDEMIA TYPE: ICD-10-CM

## 2024-12-10 LAB
ALBUMIN SERPL-MCNC: 4.2 G/DL (ref 3.4–5)
ALBUMIN/GLOB SERPL: 1.2 (ref 0.8–1.7)
ALP SERPL-CCNC: 61 U/L (ref 45–117)
ALT SERPL-CCNC: 48 U/L (ref 16–61)
ANION GAP SERPL CALC-SCNC: 4 MMOL/L (ref 3–18)
AST SERPL-CCNC: 31 U/L (ref 10–38)
BASOPHILS # BLD: 0.1 K/UL (ref 0–0.1)
BASOPHILS NFR BLD: 1 % (ref 0–2)
BILIRUB SERPL-MCNC: 1.2 MG/DL (ref 0.2–1)
BUN SERPL-MCNC: 14 MG/DL (ref 7–18)
BUN/CREAT SERPL: 13 (ref 12–20)
CALCIUM SERPL-MCNC: 10.3 MG/DL (ref 8.5–10.1)
CHLORIDE SERPL-SCNC: 104 MMOL/L (ref 100–111)
CHOLEST SERPL-MCNC: 112 MG/DL
CO2 SERPL-SCNC: 29 MMOL/L (ref 21–32)
CREAT SERPL-MCNC: 1.07 MG/DL (ref 0.6–1.3)
DIFFERENTIAL METHOD BLD: ABNORMAL
EOSINOPHIL # BLD: 0.2 K/UL (ref 0–0.4)
EOSINOPHIL NFR BLD: 3 % (ref 0–5)
ERYTHROCYTE [DISTWIDTH] IN BLOOD BY AUTOMATED COUNT: 11.8 % (ref 11.6–14.5)
GLOBULIN SER CALC-MCNC: 3.6 G/DL (ref 2–4)
GLUCOSE SERPL-MCNC: 110 MG/DL (ref 74–99)
HBA1C MFR BLD: 6.6 % (ref 4.2–5.6)
HCT VFR BLD AUTO: 42.7 % (ref 36–48)
HDLC SERPL-MCNC: 54 MG/DL (ref 40–60)
HDLC SERPL: 2.1 (ref 0–5)
HGB BLD-MCNC: 13 G/DL (ref 13–16)
IMM GRANULOCYTES # BLD AUTO: 0 K/UL (ref 0–0.04)
IMM GRANULOCYTES NFR BLD AUTO: 0 % (ref 0–0.5)
LDLC SERPL CALC-MCNC: 42.6 MG/DL (ref 0–100)
LIPID PANEL: NORMAL
LYMPHOCYTES # BLD: 2.2 K/UL (ref 0.9–3.6)
LYMPHOCYTES NFR BLD: 32 % (ref 21–52)
MCH RBC QN AUTO: 28 PG (ref 24–34)
MCHC RBC AUTO-ENTMCNC: 30.4 G/DL (ref 31–37)
MCV RBC AUTO: 91.8 FL (ref 78–100)
MONOCYTES # BLD: 0.6 K/UL (ref 0.05–1.2)
MONOCYTES NFR BLD: 10 % (ref 3–10)
NEUTS SEG # BLD: 3.6 K/UL (ref 1.8–8)
NEUTS SEG NFR BLD: 54 % (ref 40–73)
NRBC # BLD: 0 K/UL (ref 0–0.01)
NRBC BLD-RTO: 0 PER 100 WBC
PLATELET # BLD AUTO: 222 K/UL (ref 135–420)
PMV BLD AUTO: 12 FL (ref 9.2–11.8)
POTASSIUM SERPL-SCNC: 4 MMOL/L (ref 3.5–5.5)
PROT SERPL-MCNC: 7.8 G/DL (ref 6.4–8.2)
RBC # BLD AUTO: 4.65 M/UL (ref 4.35–5.65)
SODIUM SERPL-SCNC: 137 MMOL/L (ref 136–145)
TRIGL SERPL-MCNC: 77 MG/DL
VLDLC SERPL CALC-MCNC: 15.4 MG/DL
WBC # BLD AUTO: 6.7 K/UL (ref 4.6–13.2)

## 2024-12-10 PROCEDURE — 80053 COMPREHEN METABOLIC PANEL: CPT

## 2024-12-10 PROCEDURE — 36415 COLL VENOUS BLD VENIPUNCTURE: CPT

## 2024-12-10 PROCEDURE — 83036 HEMOGLOBIN GLYCOSYLATED A1C: CPT

## 2024-12-10 PROCEDURE — 85025 COMPLETE CBC W/AUTO DIFF WBC: CPT

## 2024-12-10 PROCEDURE — 80061 LIPID PANEL: CPT

## 2024-12-17 ENCOUNTER — OFFICE VISIT (OUTPATIENT)
Facility: CLINIC | Age: 64
End: 2024-12-17
Payer: COMMERCIAL

## 2024-12-17 VITALS
DIASTOLIC BLOOD PRESSURE: 79 MMHG | HEIGHT: 66 IN | BODY MASS INDEX: 36.32 KG/M2 | RESPIRATION RATE: 16 BRPM | WEIGHT: 226 LBS | HEART RATE: 66 BPM | SYSTOLIC BLOOD PRESSURE: 138 MMHG | TEMPERATURE: 98.5 F | OXYGEN SATURATION: 99 %

## 2024-12-17 DIAGNOSIS — K76.0 METABOLIC DYSFUNCTION-ASSOCIATED STEATOTIC LIVER DISEASE (MASLD): ICD-10-CM

## 2024-12-17 DIAGNOSIS — I10 PRIMARY HYPERTENSION: ICD-10-CM

## 2024-12-17 DIAGNOSIS — E66.01 SEVERE OBESITY (BMI 35.0-35.9 WITH COMORBIDITY): ICD-10-CM

## 2024-12-17 DIAGNOSIS — Z12.5 SCREENING FOR MALIGNANT NEOPLASM OF PROSTATE: ICD-10-CM

## 2024-12-17 DIAGNOSIS — E11.9 TYPE 2 DIABETES MELLITUS WITHOUT COMPLICATION, WITHOUT LONG-TERM CURRENT USE OF INSULIN (HCC): Primary | ICD-10-CM

## 2024-12-17 DIAGNOSIS — E78.5 HYPERLIPIDEMIA, UNSPECIFIED HYPERLIPIDEMIA TYPE: ICD-10-CM

## 2024-12-17 PROCEDURE — 3075F SYST BP GE 130 - 139MM HG: CPT | Performed by: INTERNAL MEDICINE

## 2024-12-17 PROCEDURE — 3078F DIAST BP <80 MM HG: CPT | Performed by: INTERNAL MEDICINE

## 2024-12-17 PROCEDURE — 3044F HG A1C LEVEL LT 7.0%: CPT | Performed by: INTERNAL MEDICINE

## 2024-12-17 PROCEDURE — 99214 OFFICE O/P EST MOD 30 MIN: CPT | Performed by: INTERNAL MEDICINE

## 2024-12-17 NOTE — PROGRESS NOTES
Ramiro Munoz presents today for   Chief Complaint   Patient presents with    4 Month Follow-Up    Hypertension    Diabetes       \"Have you been to the ER, urgent care clinic since your last visit?  Hospitalized since your last visit?\"    NO    “Have you seen or consulted any other health care providers outside of [facility] since your last visit?”    NO    “Have you had a colorectal cancer screening such as a colonoscopy/FIT/Cologuard?    YES - Type: Colonoscopy - Where: [facility]; 06/25/2024 Nurse/CMA to request most recent records if not in the chart     Date of last Colonoscopy: 9/30/2019  No cologuard on file  No FIT/FOBT on file   No flexible sigmoidoscopy on file              
with comorbidity)        6. Screening for malignant neoplasm of prostate  PSA Screening

## 2024-12-18 PROBLEM — E11.9 TYPE 2 DIABETES MELLITUS WITHOUT COMPLICATION (HCC): Status: RESOLVED | Noted: 2024-03-27 | Resolved: 2024-12-18
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
## 2025-01-31 ENCOUNTER — PHARMACY VISIT (OUTPATIENT)
Facility: CLINIC | Age: 65
End: 2025-01-31

## 2025-01-31 DIAGNOSIS — I10 PRIMARY HYPERTENSION: ICD-10-CM

## 2025-01-31 DIAGNOSIS — E11.9 TYPE 2 DIABETES MELLITUS WITHOUT COMPLICATION, WITHOUT LONG-TERM CURRENT USE OF INSULIN (HCC): Primary | ICD-10-CM

## 2025-01-31 RX ORDER — LOSARTAN POTASSIUM AND HYDROCHLOROTHIAZIDE 12.5; 5 MG/1; MG/1
1 TABLET ORAL DAILY
Qty: 90 TABLET | Refills: 3 | Status: SHIPPED | OUTPATIENT
Start: 2025-01-31

## 2025-01-31 NOTE — TELEPHONE ENCOUNTER
PCP: Saul Franco MD    LAST OFFICE VISIT: 12/17/2024    LAST REFILL PER CHART:  Medication:losartan-hydroCHLOROthiazide (HYZAAR) 50-12.5 MG per tablet   Ordered On:08/06/2024  Instructions: Take 1 tablet by mouth daily   Dispense:90 tablets  Refills:1      Future Appointments   Date Time Provider Department Center   3/14/2025  8:30 AM Pan Peters [facility] DEP   4/22/2025  9:00 AM [facility] LAB VISIT [facility]   4/29/2025  2:00 PM Saul Franco MD [facility] DEP
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
## 2025-03-14 ENCOUNTER — PHARMACY VISIT (OUTPATIENT)
Facility: CLINIC | Age: 65
End: 2025-03-14

## 2025-03-14 DIAGNOSIS — E11.9 TYPE 2 DIABETES MELLITUS WITHOUT COMPLICATION, WITHOUT LONG-TERM CURRENT USE OF INSULIN (HCC): Primary | ICD-10-CM

## 2025-03-14 NOTE — PATIENT INSTRUCTIONS
- Start Trulicity 0.75mg weekly for 4 weeks, then increase to 1.5mg weekly    For any questions, please call [phone] or your PCP office.
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
## 2025-04-22 ENCOUNTER — HOSPITAL ENCOUNTER (OUTPATIENT)
Facility: HOSPITAL | Age: 65
Setting detail: SPECIMEN
Discharge: HOME | End: 2025-04-25
Payer: COMMERCIAL

## 2025-04-22 DIAGNOSIS — Z12.5 SCREENING FOR MALIGNANT NEOPLASM OF PROSTATE: ICD-10-CM

## 2025-04-22 DIAGNOSIS — E11.9 TYPE 2 DIABETES MELLITUS WITHOUT COMPLICATION, WITHOUT LONG-TERM CURRENT USE OF INSULIN (HCC): ICD-10-CM

## 2025-04-22 DIAGNOSIS — E78.5 HYPERLIPIDEMIA, UNSPECIFIED HYPERLIPIDEMIA TYPE: ICD-10-CM

## 2025-04-22 LAB
ALBUMIN SERPL-MCNC: 4.2 G/DL (ref 3.4–5)
ALBUMIN/GLOB SERPL: 1.3 (ref 0.8–1.7)
ALP SERPL-CCNC: 57 U/L (ref 45–117)
ALT SERPL-CCNC: 44 U/L (ref 16–61)
ANION GAP SERPL CALC-SCNC: 5 MMOL/L (ref 3–18)
AST SERPL-CCNC: 31 U/L (ref 10–38)
BILIRUB SERPL-MCNC: 1.1 MG/DL (ref 0.2–1)
BUN SERPL-MCNC: 11 MG/DL (ref 7–18)
BUN/CREAT SERPL: 11 (ref 12–20)
CALCIUM SERPL-MCNC: 9.5 MG/DL (ref 8.5–10.1)
CHLORIDE SERPL-SCNC: 106 MMOL/L (ref 100–111)
CHOLEST SERPL-MCNC: 88 MG/DL
CO2 SERPL-SCNC: 28 MMOL/L (ref 21–32)
CREAT SERPL-MCNC: 1.03 MG/DL (ref 0.6–1.3)
CREAT UR-MCNC: 225 MG/DL (ref 30–125)
GLOBULIN SER CALC-MCNC: 3.2 G/DL (ref 2–4)
GLUCOSE SERPL-MCNC: 92 MG/DL (ref 74–99)
HBA1C MFR BLD: 5.7 % (ref 4.2–5.6)
HDLC SERPL-MCNC: 50 MG/DL (ref 40–60)
HDLC SERPL: 1.8 (ref 0–5)
LDLC SERPL CALC-MCNC: 27 MG/DL (ref 0–100)
LIPID PANEL: NORMAL
MICROALBUMIN UR-MCNC: 1.69 MG/DL (ref 0–3)
MICROALBUMIN/CREAT UR-RTO: 8 MG/G (ref 0–30)
POTASSIUM SERPL-SCNC: 4 MMOL/L (ref 3.5–5.5)
PROT SERPL-MCNC: 7.4 G/DL (ref 6.4–8.2)
PSA SERPL-MCNC: 0.7 NG/ML (ref 0–4)
SODIUM SERPL-SCNC: 139 MMOL/L (ref 136–145)
TRIGL SERPL-MCNC: 55 MG/DL
VLDLC SERPL CALC-MCNC: 11 MG/DL

## 2025-04-22 PROCEDURE — 36415 COLL VENOUS BLD VENIPUNCTURE: CPT

## 2025-04-22 PROCEDURE — 80061 LIPID PANEL: CPT

## 2025-04-22 PROCEDURE — 80053 COMPREHEN METABOLIC PANEL: CPT

## 2025-04-22 PROCEDURE — G0103 PSA SCREENING: HCPCS

## 2025-04-22 PROCEDURE — 83036 HEMOGLOBIN GLYCOSYLATED A1C: CPT

## 2025-04-22 PROCEDURE — 82570 ASSAY OF URINE CREATININE: CPT

## 2025-04-22 PROCEDURE — 82043 UR ALBUMIN QUANTITATIVE: CPT

## 2025-04-23 DIAGNOSIS — E11.9 TYPE 2 DIABETES MELLITUS WITHOUT COMPLICATION, WITHOUT LONG-TERM CURRENT USE OF INSULIN (HCC): ICD-10-CM
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
## 2025-04-25 ENCOUNTER — PHARMACY VISIT (OUTPATIENT)
Facility: CLINIC | Age: 65
End: 2025-04-25

## 2025-04-25 DIAGNOSIS — E11.9 TYPE 2 DIABETES MELLITUS WITHOUT COMPLICATION, WITHOUT LONG-TERM CURRENT USE OF INSULIN (HCC): Primary | ICD-10-CM

## 2025-04-25 RX ORDER — PIOGLITAZONE 15 MG/1
15 TABLET ORAL DAILY
Qty: 90 TABLET | Refills: 3 | Status: SHIPPED | OUTPATIENT
Start: 2025-04-25

## 2025-04-29 ENCOUNTER — OFFICE VISIT (OUTPATIENT)
Facility: CLINIC | Age: 65
End: 2025-04-29
Payer: COMMERCIAL

## 2025-04-29 VITALS
OXYGEN SATURATION: 96 % | HEART RATE: 63 BPM | SYSTOLIC BLOOD PRESSURE: 132 MMHG | WEIGHT: 221 LBS | RESPIRATION RATE: 16 BRPM | TEMPERATURE: 98.4 F | DIASTOLIC BLOOD PRESSURE: 86 MMHG | BODY MASS INDEX: 35.52 KG/M2 | HEIGHT: 66 IN

## 2025-04-29 DIAGNOSIS — I10 PRIMARY HYPERTENSION: Primary | ICD-10-CM

## 2025-04-29 DIAGNOSIS — E11.9 TYPE 2 DIABETES MELLITUS WITHOUT COMPLICATION, WITHOUT LONG-TERM CURRENT USE OF INSULIN (HCC): ICD-10-CM

## 2025-04-29 DIAGNOSIS — E78.5 HYPERLIPIDEMIA, UNSPECIFIED HYPERLIPIDEMIA TYPE: ICD-10-CM

## 2025-04-29 DIAGNOSIS — K76.0 METABOLIC DYSFUNCTION-ASSOCIATED STEATOTIC LIVER DISEASE (MASLD): ICD-10-CM

## 2025-04-29 DIAGNOSIS — E66.01 SEVERE OBESITY (BMI 35.0-35.9 WITH COMORBIDITY) (HCC): ICD-10-CM

## 2025-04-29 PROCEDURE — 3075F SYST BP GE 130 - 139MM HG: CPT | Performed by: INTERNAL MEDICINE

## 2025-04-29 PROCEDURE — 3044F HG A1C LEVEL LT 7.0%: CPT | Performed by: INTERNAL MEDICINE

## 2025-04-29 PROCEDURE — 99214 OFFICE O/P EST MOD 30 MIN: CPT | Performed by: INTERNAL MEDICINE

## 2025-04-29 PROCEDURE — 3079F DIAST BP 80-89 MM HG: CPT | Performed by: INTERNAL MEDICINE

## 2025-04-29 SDOH — ECONOMIC STABILITY: FOOD INSECURITY: WITHIN THE PAST 12 MONTHS, YOU WORRIED THAT YOUR FOOD WOULD RUN OUT BEFORE YOU GOT MONEY TO BUY MORE.: NEVER TRUE

## 2025-04-29 SDOH — ECONOMIC STABILITY: FOOD INSECURITY: WITHIN THE PAST 12 MONTHS, THE FOOD YOU BOUGHT JUST DIDN'T LAST AND YOU DIDN'T HAVE MONEY TO GET MORE.: NEVER TRUE

## 2025-04-29 ASSESSMENT — PATIENT HEALTH QUESTIONNAIRE - PHQ9
SUM OF ALL RESPONSES TO PHQ QUESTIONS 1-9: 0
SUM OF ALL RESPONSES TO PHQ QUESTIONS 1-9: 0
2. FEELING DOWN, DEPRESSED OR HOPELESS: NOT AT ALL
SUM OF ALL RESPONSES TO PHQ QUESTIONS 1-9: 0
1. LITTLE INTEREST OR PLEASURE IN DOING THINGS: NOT AT ALL
SUM OF ALL RESPONSES TO PHQ QUESTIONS 1-9: 0

## 2025-04-29 NOTE — PROGRESS NOTES
Ramiro Munoz presents today for   Chief Complaint   Patient presents with    4 Month Follow-Up    Hypertension    Diabetes       \"Have you been to the ER, urgent care clinic since your last visit?  Hospitalized since your last visit?\"    NO    “Have you seen or consulted any other health care providers outside of [facility] since your last visit?”    NO

## 2025-05-27 DIAGNOSIS — E11.9 TYPE 2 DIABETES MELLITUS WITHOUT COMPLICATION, WITHOUT LONG-TERM CURRENT USE OF INSULIN (HCC): ICD-10-CM

## 2025-05-27 NOTE — TELEPHONE ENCOUNTER
The patient is requesting the prescription for Trulicity be resent to pharmacy below due to the current pharmacy out of stock.    Please resend to - [facility]/PHARMACY #[id number] - [place], [place] - [address] - P [phone] - F [phone] [[id number]]

## 2025-06-02 DIAGNOSIS — E11.9 TYPE 2 DIABETES MELLITUS WITHOUT COMPLICATION, WITHOUT LONG-TERM CURRENT USE OF INSULIN (HCC): ICD-10-CM

## 2025-06-02 NOTE — TELEPHONE ENCOUNTER
Patient needs his trulicity transferred to a different pharmacy    Please send JESÚS to    [facility]/pharmacy #[id number] - [place] - [address] - P [phone] - F [phone]

## 2025-06-30 DIAGNOSIS — E11.9 TYPE 2 DIABETES MELLITUS WITHOUT COMPLICATION, WITHOUT LONG-TERM CURRENT USE OF INSULIN (HCC): ICD-10-CM

## 2025-07-03 RX ORDER — DULAGLUTIDE 3 MG/.5ML
INJECTION, SOLUTION SUBCUTANEOUS
Qty: 2 ML | Refills: 1 | Status: SHIPPED | OUTPATIENT
Start: 2025-07-03

## 2025-07-14 ENCOUNTER — TELEPHONE (OUTPATIENT)
Facility: CLINIC | Age: 65
End: 2025-07-14

## 2025-07-14 NOTE — TELEPHONE ENCOUNTER
Patient is requesting a call from Dr. Dr. Peters concerning the price of Trulicity. He said he was paying $ 20 for the medication but now the price had increased to $150. The patients says this happened before and \"Dr. Peters did something \" after that he only paid  $20.    Please advise.

## 2025-07-18 NOTE — TELEPHONE ENCOUNTER
Pt called in states his copay card is not on file at the new pharmacy for his medication.     Please advise.     Pharmacy   [facility]/PHARMACY #[id number] - [place], [place] - [address] - P [phone] - F [phone] [[id number]]
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
## 2025-08-01 ENCOUNTER — PHARMACY VISIT (OUTPATIENT)
Facility: CLINIC | Age: 65
End: 2025-08-01

## 2025-08-01 DIAGNOSIS — E11.9 TYPE 2 DIABETES MELLITUS WITHOUT COMPLICATION, WITHOUT LONG-TERM CURRENT USE OF INSULIN (HCC): Primary | ICD-10-CM

## 2025-08-21 DIAGNOSIS — E11.9 TYPE 2 DIABETES MELLITUS WITHOUT COMPLICATION, WITHOUT LONG-TERM CURRENT USE OF INSULIN (HCC): ICD-10-CM

## 2025-08-22 RX ORDER — ATORVASTATIN CALCIUM 40 MG/1
TABLET, FILM COATED ORAL
Qty: 90 TABLET | Refills: 3 | Status: SHIPPED | OUTPATIENT
Start: 2025-08-22